# Patient Record
Sex: FEMALE | Race: WHITE | NOT HISPANIC OR LATINO | Employment: OTHER | URBAN - METROPOLITAN AREA
[De-identification: names, ages, dates, MRNs, and addresses within clinical notes are randomized per-mention and may not be internally consistent; named-entity substitution may affect disease eponyms.]

---

## 2017-01-21 RX ORDER — HYDROXYZINE PAMOATE 25 MG/1
25 CAPSULE ORAL
COMMUNITY
End: 2017-05-11

## 2017-01-21 RX ORDER — DICLOFENAC SODIUM 75 MG/1
75 TABLET, DELAYED RELEASE ORAL
COMMUNITY
End: 2017-05-11

## 2017-01-21 RX ORDER — GABAPENTIN 400 MG/1
300 CAPSULE ORAL
COMMUNITY

## 2017-01-21 RX ORDER — LITHIUM CARBONATE 300 MG/1
300 CAPSULE ORAL
COMMUNITY

## 2017-01-21 RX ORDER — CYPROHEPTADINE HYDROCHLORIDE 4 MG/1
4 TABLET ORAL 3 TIMES DAILY PRN
COMMUNITY

## 2017-01-21 RX ORDER — ATORVASTATIN CALCIUM 10 MG/1
10 TABLET, FILM COATED ORAL
COMMUNITY
End: 2017-05-11

## 2017-01-21 RX ORDER — OMEPRAZOLE 20 MG/1
20 CAPSULE, DELAYED RELEASE ORAL
COMMUNITY
End: 2017-05-11

## 2017-02-02 ENCOUNTER — OFFICE VISIT (OUTPATIENT)
Dept: NEUROSURGERY | Facility: CLINIC | Age: 57
End: 2017-02-02

## 2017-02-02 VITALS
BODY MASS INDEX: 33.6 KG/M2 | WEIGHT: 182.6 LBS | SYSTOLIC BLOOD PRESSURE: 146 MMHG | DIASTOLIC BLOOD PRESSURE: 79 MMHG | HEIGHT: 62 IN | HEART RATE: 85 BPM

## 2017-02-02 DIAGNOSIS — G89.29 CHRONIC BILATERAL LOW BACK PAIN WITHOUT SCIATICA: Primary | ICD-10-CM

## 2017-02-02 DIAGNOSIS — M54.50 CHRONIC BILATERAL LOW BACK PAIN WITHOUT SCIATICA: Primary | ICD-10-CM

## 2017-02-02 PROCEDURE — 99204 OFFICE O/P NEW MOD 45 MIN: CPT | Performed by: NEUROLOGICAL SURGERY

## 2017-02-02 NOTE — PATIENT INSTRUCTIONS

## 2017-02-02 NOTE — PROGRESS NOTES
Subjective   Patient ID: Caitlin Garcia is a 56 y.o. female is being seen for consultation today at the request of Burke Nielsen MD  for back and bilateral leg pain.  She has not had any treatments.     History of Present Illness    This patient has been having pain in her back.  She says she has some pain in her legs but this is been going on for a lot longer than the pain in the back.  There really doesn't sound like there is any radiating pain.  The pain is in her back is sharp and stabbing and when it is present it is quite severe.  It does come and go to some extent however.  Activity seems to make the pain worse and nothing really makes it better.  She has had no specific treatment for her lower back up to this point.  She has a history of pain in her neck but currently her neck isn't bothering her at all.  She has no difficulty with bowel and bladder control or other associated symptoms.    The following portions of the patient's history were reviewed and updated as appropriate: allergies, current medications, past family history, past medical history, past social history, past surgical history and problem list.    Review of Systems   Constitutional: Positive for activity change.   Respiratory: Negative for shortness of breath.    Cardiovascular: Negative for chest pain.   Musculoskeletal: Positive for back pain (bilateral leg pain ) and gait problem.   Neurological: Positive for weakness.   All other systems reviewed and are negative.      Objective   Physical Exam   Constitutional: She is oriented to person, place, and time. She appears well-developed and well-nourished.   HENT:   Head: Normocephalic and atraumatic.   Eyes: Conjunctivae and EOM are normal. Pupils are equal, round, and reactive to light.   Fundoscopic exam:       The right eye shows no papilledema. The right eye shows venous pulsations.        The left eye shows no papilledema. The left eye shows venous pulsations.   Neck: Carotid  bruit is not present.   Neurological: She is oriented to person, place, and time. She has a normal Finger-Nose-Finger Test and a normal Heel to Shin Test. Gait normal.   Reflex Scores:       Tricep reflexes are 2+ on the right side and 2+ on the left side.       Bicep reflexes are 2+ on the right side and 2+ on the left side.       Brachioradialis reflexes are 2+ on the right side and 2+ on the left side.       Patellar reflexes are 2+ on the right side and 2+ on the left side.       Achilles reflexes are 2+ on the right side and 2+ on the left side.  Psychiatric: Her speech is normal.     Neurologic Exam     Mental Status   Oriented to person, place, and time.   Registration of memory: Good recent and remote memory.   Attention: normal. Concentration: normal.   Speech: speech is normal   Level of consciousness: alert  Knowledge: consistent with education.     Cranial Nerves     CN II   Visual fields full to confrontation.   Visual acuity: normal    CN III, IV, VI   Pupils are equal, round, and reactive to light.  Extraocular motions are normal.     CN V   Facial sensation intact.   Right corneal reflex: normal  Left corneal reflex: normal    CN VII   Facial expression full, symmetric.   Right facial weakness: none  Left facial weakness: none    CN VIII   Hearing: intact    CN IX, X   Palate: symmetric    CN XI   Right sternocleidomastoid strength: normal  Left sternocleidomastoid strength: normal    CN XII   Tongue: not atrophic  Tongue deviation: none    Motor Exam   Muscle bulk: normal  Right arm tone: normal  Left arm tone: normal  Right leg tone: normal  Left leg tone: normal    Strength   Strength 5/5 except as noted.     Sensory Exam   Light touch normal.     Gait, Coordination, and Reflexes     Gait  Gait: normal    Coordination   Finger to nose coordination: normal  Heel to shin coordination: normal    Reflexes   Right brachioradialis: 2+  Left brachioradialis: 2+  Right biceps: 2+  Left biceps: 2+  Right  triceps: 2+  Left triceps: 2+  Right patellar: 2+  Left patellar: 2+  Right achilles: 2+  Left achilles: 2+  Right : 2+  Left : 2+      Assessment/Plan   Independent Review of Radiographic Studies:      I reviewed her x-rays of the cervical and the lumbar spine.  These were done at [facility] [place] and I reviewed the films myself.  The lumbar spine shows a grade 3 spondylolisthesis of L5 on S1 and was done in November of last year.  The x-rays of her cervical spine show marked degenerative changes there as well.  There is some subluxation at C3 4 and C4 5.    Medical Decision Making:      I told the patient that because of the severe spondylolisthesis at L5-S1 we really need to check an MRI of her lumbar spine just to be sure that she doesn't have anything dangerous.  I will call her with the results.  If those look okay then we can consider some epidural blocks if the pain flares up again.  I did tell her she has to lose some weight.    I counseled this patient with regard to weight loss.  We gave him a handout on exercise for weight loss and I told the patient to talk to their family doctor about a proper diet.    There are no diagnoses linked to this encounter.  No Follow-up on file.

## 2017-02-25 ENCOUNTER — HOSPITAL ENCOUNTER (OUTPATIENT)
Dept: MRI IMAGING | Facility: HOSPITAL | Age: 57
Discharge: HOME | End: 2017-02-25
Attending: NEUROLOGICAL SURGERY | Admitting: NEUROLOGICAL SURGERY

## 2017-02-25 DIAGNOSIS — M54.50 CHRONIC BILATERAL LOW BACK PAIN WITHOUT SCIATICA: ICD-10-CM

## 2017-02-25 DIAGNOSIS — G89.29 CHRONIC BILATERAL LOW BACK PAIN WITHOUT SCIATICA: ICD-10-CM

## 2017-02-25 PROCEDURE — 72148 MRI LUMBAR SPINE W/O DYE: CPT

## 2017-04-03 DIAGNOSIS — Z92.29 HISTORY OF POSTMENOPAUSAL HRT: ICD-10-CM

## 2017-04-03 RX ORDER — MEDROXYPROGESTERONE ACETATE 2.5 MG/1
2.5 TABLET ORAL DAILY
Qty: 30 TABLET | OUTPATIENT
Start: 2017-04-03

## 2017-04-12 ENCOUNTER — TELEPHONE (OUTPATIENT)
Dept: NEUROSURGERY | Facility: CLINIC | Age: 57
End: 2017-04-12

## 2017-04-13 ENCOUNTER — TELEPHONE (OUTPATIENT)
Dept: NEUROSURGERY | Facility: CLINIC | Age: 57
End: 2017-04-13

## 2017-04-13 NOTE — TELEPHONE ENCOUNTER
I called this patient early this morning with MRI results.  She had her MRI in February and somehow I failed to call her with the results.  Please apologize to her when she calls back.  We also called her this afternoon but had to leave a voicemail a second time for her to call back.  Her MRI shows the spondylolisthesis that we already knew about but no evidence of significant compression of the neural elements.  Consequently I would tend to just leave this alone completely at this point.  If she doesn't feel she can do that or if she needs to do something more aggressive to get rid of pain than I would suggest some lumbar epidural blocks.  Please let her know when she calls back area

## 2017-04-14 NOTE — TELEPHONE ENCOUNTER
Patient called back and was given the results from below from Dr. Jack. She understands and she would really prefer to wait on having any VASQUEZ at the current moment, she says she has has steriod shots in her knees before with no relief so she will just wait even though she knows they are different. She will call us back with any further problems

## 2017-05-04 ENCOUNTER — PROCEDURE VISIT (OUTPATIENT)
Dept: OBSTETRICS AND GYNECOLOGY | Facility: CLINIC | Age: 57
End: 2017-05-04

## 2017-05-04 ENCOUNTER — OFFICE VISIT (OUTPATIENT)
Dept: OBSTETRICS AND GYNECOLOGY | Facility: CLINIC | Age: 57
End: 2017-05-04

## 2017-05-04 VITALS
DIASTOLIC BLOOD PRESSURE: 84 MMHG | HEIGHT: 62 IN | BODY MASS INDEX: 32.94 KG/M2 | SYSTOLIC BLOOD PRESSURE: 130 MMHG | WEIGHT: 179 LBS

## 2017-05-04 DIAGNOSIS — N95.0 POST-MENOPAUSAL BLEEDING: ICD-10-CM

## 2017-05-04 DIAGNOSIS — Z01.419 GYNECOLOGIC EXAM NORMAL: Primary | ICD-10-CM

## 2017-05-04 DIAGNOSIS — N95.0 POSTMENOPAUSAL VAGINAL BLEEDING: Primary | ICD-10-CM

## 2017-05-04 PROCEDURE — G0101 CA SCREEN;PELVIC/BREAST EXAM: HCPCS | Performed by: NURSE PRACTITIONER

## 2017-05-04 PROCEDURE — 76830 TRANSVAGINAL US NON-OB: CPT | Performed by: NURSE PRACTITIONER

## 2017-05-04 RX ORDER — OMEPRAZOLE 20 MG/1
20 CAPSULE, DELAYED RELEASE ORAL
COMMUNITY

## 2017-05-04 RX ORDER — LITHIUM CARBONATE 300 MG/1
300 CAPSULE ORAL
COMMUNITY
End: 2017-05-11

## 2017-05-04 RX ORDER — DICLOFENAC SODIUM 75 MG/1
75 TABLET, DELAYED RELEASE ORAL
COMMUNITY
End: 2022-10-22

## 2017-05-04 RX ORDER — GABAPENTIN 400 MG/1
400 CAPSULE ORAL
COMMUNITY
End: 2017-05-11

## 2017-05-04 RX ORDER — HYDROXYZINE PAMOATE 25 MG/1
25 CAPSULE ORAL
COMMUNITY

## 2017-05-04 RX ORDER — LURASIDONE HYDROCHLORIDE 20 MG/1
TABLET, FILM COATED ORAL
COMMUNITY

## 2017-05-04 RX ORDER — ATORVASTATIN CALCIUM 10 MG/1
10 TABLET, FILM COATED ORAL
COMMUNITY

## 2017-05-04 RX ORDER — ESTRADIOL 2 MG/1
1 TABLET ORAL
COMMUNITY
End: 2017-05-11

## 2017-05-04 RX ORDER — MEDROXYPROGESTERONE ACETATE 2.5 MG/1
2.5 TABLET ORAL
COMMUNITY
End: 2017-05-11

## 2017-05-08 LAB
CONV .: NORMAL
CYTOLOGIST CVX/VAG CYTO: NORMAL
CYTOLOGY CVX/VAG DOC THIN PREP: NORMAL
DX ICD CODE: NORMAL
DX ICD CODE: NORMAL
HIV 1 & 2 AB SER-IMP: NORMAL
OTHER STN SPEC: NORMAL
PATH REPORT.FINAL DX SPEC: NORMAL
STAT OF ADQ CVX/VAG CYTO-IMP: NORMAL

## 2017-05-11 ENCOUNTER — OFFICE VISIT (OUTPATIENT)
Dept: OBSTETRICS AND GYNECOLOGY | Facility: CLINIC | Age: 57
End: 2017-05-11

## 2017-05-11 VITALS
DIASTOLIC BLOOD PRESSURE: 72 MMHG | BODY MASS INDEX: 34.55 KG/M2 | SYSTOLIC BLOOD PRESSURE: 112 MMHG | HEIGHT: 61 IN | WEIGHT: 183 LBS

## 2017-05-11 DIAGNOSIS — N95.0 POST-MENOPAUSAL BLEEDING: Primary | ICD-10-CM

## 2017-05-11 PROCEDURE — 58100 BIOPSY OF UTERUS LINING: CPT | Performed by: NURSE PRACTITIONER

## 2017-05-12 LAB — FSH SERPL-ACNC: 19.4 MIU/ML

## 2017-05-15 ENCOUNTER — TELEPHONE (OUTPATIENT)
Dept: OBSTETRICS AND GYNECOLOGY | Facility: CLINIC | Age: 57
End: 2017-05-15

## 2017-05-18 LAB
DX ICD CODE: NORMAL
DX ICD CODE: NORMAL
PATH REPORT.FINAL DX SPEC: NORMAL
PATH REPORT.GROSS SPEC: NORMAL
PATH REPORT.SITE OF ORIGIN SPEC: NORMAL
PATHOLOGIST NAME: NORMAL
PAYMENT PROCEDURE: NORMAL

## 2017-05-23 ENCOUNTER — TELEPHONE (OUTPATIENT)
Dept: OBSTETRICS AND GYNECOLOGY | Facility: CLINIC | Age: 57
End: 2017-05-23

## 2017-05-23 DIAGNOSIS — Z92.29 HISTORY OF POSTMENOPAUSAL HRT: ICD-10-CM

## 2017-05-23 RX ORDER — ESTRADIOL 2 MG/1
2 TABLET ORAL DAILY
Qty: 30 TABLET | Refills: 11 | Status: SHIPPED | OUTPATIENT
Start: 2017-05-23 | End: 2018-05-29 | Stop reason: SDUPTHER

## 2017-05-23 RX ORDER — MEDROXYPROGESTERONE ACETATE 2.5 MG/1
2.5 TABLET ORAL DAILY
Qty: 30 TABLET | Refills: 12 | Status: SHIPPED | OUTPATIENT
Start: 2017-05-23 | End: 2018-06-15 | Stop reason: SDUPTHER

## 2018-05-29 RX ORDER — ESTRADIOL 2 MG/1
TABLET ORAL
Qty: 30 TABLET | Refills: 10 | Status: SHIPPED | OUTPATIENT
Start: 2018-05-29 | End: 2019-04-20 | Stop reason: SDUPTHER

## 2018-06-15 DIAGNOSIS — Z92.29 HISTORY OF POSTMENOPAUSAL HRT: ICD-10-CM

## 2018-06-21 RX ORDER — MEDROXYPROGESTERONE ACETATE 2.5 MG/1
TABLET ORAL
Qty: 30 TABLET | Refills: 11 | Status: SHIPPED | OUTPATIENT
Start: 2018-06-21 | End: 2019-06-17 | Stop reason: SDUPTHER

## 2019-03-14 ENCOUNTER — APPOINTMENT (OUTPATIENT)
Dept: GENERAL RADIOLOGY | Facility: HOSPITAL | Age: 59
End: 2019-03-14

## 2019-03-14 ENCOUNTER — HOSPITAL ENCOUNTER (EMERGENCY)
Facility: HOSPITAL | Age: 59
Discharge: HOME | End: 2019-03-14
Attending: EMERGENCY MEDICINE | Admitting: EMERGENCY MEDICINE

## 2019-03-14 ENCOUNTER — APPOINTMENT (OUTPATIENT)
Dept: CT IMAGING | Facility: HOSPITAL | Age: 59
End: 2019-03-14

## 2019-03-14 VITALS
BODY MASS INDEX: 34.58 KG/M2 | DIASTOLIC BLOOD PRESSURE: 73 MMHG | SYSTOLIC BLOOD PRESSURE: 128 MMHG | RESPIRATION RATE: 18 BRPM | TEMPERATURE: 99.7 F | HEIGHT: 61 IN | OXYGEN SATURATION: 97 % | HEART RATE: 89 BPM

## 2019-03-14 DIAGNOSIS — S99.191A CLOSED FRACTURE OF BASE OF FIFTH METATARSAL BONE OF RIGHT FOOT AT METAPHYSEAL-DIAPHYSEAL JUNCTION, INITIAL ENCOUNTER: Primary | ICD-10-CM

## 2019-03-14 PROCEDURE — 99283 EMERGENCY DEPT VISIT LOW MDM: CPT

## 2019-03-14 PROCEDURE — 73630 X-RAY EXAM OF FOOT: CPT

## 2019-03-14 PROCEDURE — 73502 X-RAY EXAM HIP UNI 2-3 VIEWS: CPT

## 2019-03-14 PROCEDURE — 70450 CT HEAD/BRAIN W/O DYE: CPT

## 2019-03-14 RX ORDER — HYDROCODONE BITARTRATE AND ACETAMINOPHEN 5; 325 MG/1; MG/1
1 TABLET ORAL ONCE
Status: COMPLETED | OUTPATIENT
Start: 2019-03-14 | End: 2019-03-14

## 2019-03-14 RX ORDER — HYDROCODONE BITARTRATE AND ACETAMINOPHEN 5; 325 MG/1; MG/1
1 TABLET ORAL EVERY 6 HOURS PRN
Qty: 12 TABLET | Refills: 0 | Status: SHIPPED | OUTPATIENT
Start: 2019-03-14

## 2019-03-14 RX ORDER — DOCUSATE SODIUM 100 MG/1
100 CAPSULE, LIQUID FILLED ORAL 2 TIMES DAILY PRN
Qty: 20 CAPSULE | Refills: 0 | Status: SHIPPED | OUTPATIENT
Start: 2019-03-14

## 2019-03-14 RX ADMIN — HYDROCODONE BITARTRATE AND ACETAMINOPHEN 1 TABLET: 5; 325 TABLET ORAL at 21:45

## 2019-03-14 NOTE — ED TRIAGE NOTES
To ER via [facility].  C/o trip and fall.  Pain in rt foot.  States possibly hit head.  C/o pain to top, left side of head.  Landed on concrete.

## 2019-03-15 NOTE — ED PROVIDER NOTES
EMERGENCY DEPARTMENT ENCOUNTER    CHIEF COMPLAINT  Chief Complaint: Foot pain  History given by: pt  History limited by: nothing  Room Number: 08/08  PMD: Betsy Justin APRN      HPI:  Pt is a 58 y.o. female who presents complaining of pain in her right foot after tripping and falling outside tonight. Pt also c/o headache from hitting her head a cement planter. Pt broke her right foot 3 years ago and says it feels the same as then. Pt denies taking any pain medication today. She denies any numbness or weakness to the foot other than from pain. Denies ankle or lower leg pain. Left hip pain also mentioned. She denies new back or neck pain. Denies dizziness or visual changes.    Duration:  tonight  Onset: gradual  Timing: constant  Location: R foot  Radiation: none  Quality: pain  Intensity/Severity: moderate  Progression: worsening  Associated Symptoms: HA  Aggravating Factors: fall  Alleviating Factors: none  Previous Episodes: none  Treatment before arrival: none    PAST MEDICAL HISTORY  Active Ambulatory Problems     Diagnosis Date Noted   • Chronic bilateral low back pain without sciatica 02/02/2017     Resolved Ambulatory Problems     Diagnosis Date Noted   • No Resolved Ambulatory Problems     Past Medical History:   Diagnosis Date   • Foot injury        PAST SURGICAL HISTORY  Past Surgical History:   Procedure Laterality Date   • FOOT SURGERY Left 2014   • KNEE SURGERY  1990   • WISDOM TOOTH EXTRACTION         FAMILY HISTORY  Family History   Problem Relation Age of Onset   • Colon cancer Mother    • Liver disease Father    • No Known Problems Sister    • No Known Problems Brother    • Breast cancer Neg Hx    • Ovarian cancer Neg Hx    • Uterine cancer Neg Hx    • Deep vein thrombosis Neg Hx    • Pulmonary embolism Neg Hx    • Osteoporosis Neg Hx        SOCIAL HISTORY  Social History     Socioeconomic History   • Marital status:      Spouse name: Not on file   • Number of children: 1   • Years of  education: BA   • Highest education level: Not on file   Social Needs   • Financial resource strain: Not on file   • Food insecurity - worry: Not on file   • Food insecurity - inability: Not on file   • Transportation needs - medical: Not on file   • Transportation needs - non-medical: Not on file   Occupational History   • Occupation: Unemployed   Tobacco Use   • Smoking status: Former Smoker     Types: Cigarettes     Last attempt to quit:      Years since quittin.2   • Smokeless tobacco: Never Used   Substance and Sexual Activity   • Alcohol use: Yes     Comment: occasionally   • Drug use: No   • Sexual activity: Not Currently   Other Topics Concern   • Not on file   Social History Narrative   • Not on file       ALLERGIES  Patient has no known allergies.    REVIEW OF SYSTEMS  Review of Systems   All other systems reviewed and are negative.  Unless otherwise stated above.     PHYSICAL EXAM  ED Triage Vitals [19]   Temp Heart Rate Resp BP SpO2   99.7 °F (37.6 °C) 97 18 111/68 97 %      Temp src Heart Rate Source Patient Position BP Location FiO2 (%)   Tympanic Monitor -- -- --       Physical Exam   Constitutional: She is oriented to person, place, and time and well-developed, well-nourished, and in no distress. No distress.   HENT:   Head: Normocephalic and atraumatic.   Mouth/Throat: Mucous membranes are normal.   No scalp hematomas or lacerations noted.    Eyes: EOM are normal. Pupils are equal, round, and reactive to light.   Neck: Normal range of motion. Neck supple.   No midline tenderness   Cardiovascular: Normal rate, regular rhythm and intact distal pulses. Exam reveals no gallop and no friction rub.   No murmur heard.  Pulmonary/Chest: Effort normal and breath sounds normal. No respiratory distress. She has no wheezes. She has no rhonchi. She has no rales.   Breath sounds are symmetric.   Abdominal: Soft. She exhibits no distension. There is no tenderness. There is no guarding.    Musculoskeletal: Normal range of motion.   Tenderness at base of R 5th toe. No edema no ecchymosis. No malleolar tenderness. No navicular pain to palpation.    Neurological: She is alert and oriented to person, place, and time. She has normal sensation and normal strength.   moving all extremities, no focal deficits   Skin: Skin is warm and dry.   Psychiatric:   Calm, cooperative       LAB RESULTS  Lab Results (last 24 hours)     ** No results found for the last 24 hours. **          I ordered the above labs and reviewed the results    RADIOLOGY  XR Hip With or Without Pelvis 2 - 3 View Left   Final Result   1. No acute osseous abnormality.       This report was finalized on 3/14/2019 10:10 PM by Arnulfo Combs M.D.          CT Head Without Contrast   Final Result   1. No acute intracranial abnormality.                           This report was finalized on 3/14/2019 9:32 PM by Arnulfo Combs M.D.          XR Foot 3+ View Right   Final Result   1. Fifth metatarsal fracture.       This report was finalized on 3/14/2019 9:06 PM by Arnulfo Combs M.D.               I ordered the above noted radiological studies. Interpreted by radiologist. Reviewed by me in PACS.       PROCEDURES  Procedures      PROGRESS AND CONSULTS     2140- Notified patient of Tatum fracture of the right foot and need for splint. Ordered XR Hip for further evaluation, though patient noted to have full ROM of the hip. Also ordered pt Norco for pain management.     2210-Notified of negative hip x-ray. patient placed into right lower leg splint in the interim by MERY Ramos. Patient given crutches for support, non-weightbearing until cleared by Orthopedics. Return for worsening symptoms as needed.       MEDICAL DECISION MAKING  Results were reviewed/discussed with the patient and they were also made aware of online access. Pt also made aware that some labs, such as cultures, will not be resulted during ER visit and follow up with PMD is  necessary.     MDM  Number of Diagnoses or Management Options  Closed fracture of base of fifth metatarsal bone of right foot at metaphyseal-diaphyseal junction, initial encounter:      Amount and/or Complexity of Data Reviewed  Tests in the radiology section of CPT®: reviewed and ordered (XR Foot shows a 5th metatarsal fx)  Decide to obtain previous medical records or to obtain history from someone other than the patient: yes  Review and summarize past medical records: yes           DIAGNOSIS  Final diagnoses:   Closed fracture of base of fifth metatarsal bone of right foot at metaphyseal-diaphyseal junction, initial encounter       DISPOSITION  DISCHARGE    Patient discharged in stable condition.    Reviewed implications of results, diagnosis, meds, responsibility to follow up, warning signs and symptoms of possible worsening, potential complications and reasons to return to ER.    Patient/Family voiced understanding of above instructions.    Discussed plan for discharge, as there is no emergent indication for admission. Patient referred to primary care provider for BP management due to today's BP. Pt/family is agreeable and understands need for follow up and repeat testing.  Pt is aware that discharge does not mean that nothing is wrong but it indicates no emergency is present that requires admission and they must continue care with follow-up as given below or physician of their choice.     FOLLOW-UP  Betsy Justin, APRN  [address]  [facility] [id number] [phone]    Schedule an appointment as soon as possible for a visit       [facility] Emergency Department  [address] [address] [phone]    As needed, If symptoms worsen    Luis Manuel Arevalo MD  [address]  [facility] [id number] [phone]    Schedule an appointment as soon as possible for a visit            Medication List      New Prescriptions    docusate sodium 100 MG  capsule  Commonly known as:  COLACE  Take 1 capsule by mouth 2 (Two) Times a Day As Needed for Constipation.     HYDROcodone-acetaminophen 5-325 MG per tablet  Commonly known as:  NORCO  Take 1 tablet by mouth Every 6 (Six) Hours As Needed for Severe Pain . Do   not drive or mix with alcohol              Latest Documented Vital Signs:  As of 10:41 PM  BP- 111/68 HR- 97 Temp- 99.7 °F (37.6 °C) (Tympanic) O2 sat- 97%    --  Documentation assistance provided by fawn Helm for Dr. Do.  Information recorded by the scribe was done at my direction and has been verified and validated by me.         Cristel Helm  03/14/19 [id number]       Avila Do MD  03/14/19 [id number]

## 2019-03-15 NOTE — DISCHARGE INSTRUCTIONS
Non-weightbearing on the right foot until cleared by orthopedics. Take medications as prescribed. Follow up with Orthopedics. Return for worsening symptoms as needed.

## 2019-03-15 NOTE — ED PROVIDER NOTES
Splint - Cast - Strapping  Date/Time: 3/14/2019 10:02 PM  Performed by: Saul Reis PA  Authorized by: Avila Do MD     Consent:     Consent obtained:  Verbal    Consent given by:  Patient    Risks discussed:  Pain  Pre-procedure details:     Sensation:  Normal  Procedure details:     Location:  Leg    Leg:  R lower leg    Splint type:  Short leg (Posterior)    Supplies:  Ortho-Glass  Post-procedure details:     Pain:  Unchanged    Sensation:  Normal    Patient tolerance of procedure:  Tolerated well, no immediate complications        Documentation assistance provided by fawn Castro for Saul Reis PA-C.  Information recorded by the scribe was done at my direction and has been verified and validated by me.       Madeleine Castro  03/14/19 2215       Saul Reis PA  03/15/19 0040

## 2019-04-22 RX ORDER — ESTRADIOL 2 MG/1
TABLET ORAL
Qty: 30 TABLET | Refills: 0 | Status: SHIPPED | OUTPATIENT
Start: 2019-04-22 | End: 2019-05-18 | Stop reason: SDUPTHER

## 2019-05-21 RX ORDER — ESTRADIOL 2 MG/1
TABLET ORAL
Qty: 30 TABLET | Refills: 0 | Status: SHIPPED | OUTPATIENT
Start: 2019-05-21

## 2019-06-10 ENCOUNTER — TELEPHONE (OUTPATIENT)
Dept: OBSTETRICS AND GYNECOLOGY | Facility: CLINIC | Age: 59
End: 2019-06-10

## 2019-06-10 NOTE — TELEPHONE ENCOUNTER
PT called regarding refills of medications. Informed PT she was last seen by Joan Olsen in 2017 ad needs to get re established with another provider. PT states she does not have to be seen but every 5 yrs. Informed PT that per Joan's notes its every year and previous office visit stated to return in 3 months. Informed PT after 3yrs it is considered a new patient. PT requested female provider, informed PT I could schedule her with a female provider but it may be a few months before the first appt.  PT states she does not understand what I am saying and hung up

## 2019-06-17 DIAGNOSIS — Z92.29 HISTORY OF POSTMENOPAUSAL HRT: ICD-10-CM

## 2019-06-17 RX ORDER — MEDROXYPROGESTERONE ACETATE 2.5 MG/1
TABLET ORAL
Qty: 90 TABLET | Refills: 1 | Status: SHIPPED | OUTPATIENT
Start: 2019-06-17 | End: 2019-06-27 | Stop reason: SDUPTHER

## 2019-06-17 NOTE — TELEPHONE ENCOUNTER
Dr. Carlos Ferris patient. Refilled some provera, but needs to be scheduled for annual/follow up with Dr. Gonzalez.     Thanks   Dr. Zarate    Pt seeing Carlos

## 2019-06-26 RX ORDER — ESTRADIOL 2 MG/1
TABLET ORAL
Qty: 30 TABLET | Refills: 0 | OUTPATIENT
Start: 2019-06-26

## 2019-06-27 RX ORDER — MEDROXYPROGESTERONE ACETATE 2.5 MG/1
2.5 TABLET ORAL DAILY
Qty: 90 TABLET | Refills: 0 | Status: SHIPPED | OUTPATIENT
Start: 2019-06-27

## 2019-09-04 RX ORDER — FLUTICASONE PROPIONATE 50 MCG
SPRAY, SUSPENSION (ML) NASAL
Qty: 48 G | Refills: 0 | OUTPATIENT
Start: 2019-09-04

## 2022-08-04 ENCOUNTER — APPOINTMENT (OUTPATIENT)
Dept: GENERAL RADIOLOGY | Facility: HOSPITAL | Age: 62
End: 2022-08-04

## 2022-08-04 ENCOUNTER — HOSPITAL ENCOUNTER (EMERGENCY)
Facility: HOSPITAL | Age: 62
Discharge: HOME | End: 2022-08-04
Attending: EMERGENCY MEDICINE | Admitting: EMERGENCY MEDICINE

## 2022-08-04 ENCOUNTER — APPOINTMENT (OUTPATIENT)
Dept: CT IMAGING | Facility: HOSPITAL | Age: 62
End: 2022-08-04

## 2022-08-04 VITALS
SYSTOLIC BLOOD PRESSURE: 139 MMHG | RESPIRATION RATE: 18 BRPM | DIASTOLIC BLOOD PRESSURE: 85 MMHG | HEART RATE: 70 BPM | OXYGEN SATURATION: 98 % | TEMPERATURE: 97.5 F

## 2022-08-04 DIAGNOSIS — S09.90XA INJURY OF HEAD, INITIAL ENCOUNTER: ICD-10-CM

## 2022-08-04 DIAGNOSIS — R20.0 RIGHT FACIAL NUMBNESS: ICD-10-CM

## 2022-08-04 DIAGNOSIS — S90.32XA CONTUSION OF LEFT FOOT, INITIAL ENCOUNTER: Primary | ICD-10-CM

## 2022-08-04 DIAGNOSIS — S90.31XA CONTUSION OF RIGHT FOOT, INITIAL ENCOUNTER: ICD-10-CM

## 2022-08-04 PROCEDURE — 99283 EMERGENCY DEPT VISIT LOW MDM: CPT

## 2022-08-04 PROCEDURE — 73630 X-RAY EXAM OF FOOT: CPT

## 2022-08-04 PROCEDURE — 70450 CT HEAD/BRAIN W/O DYE: CPT

## 2022-08-04 NOTE — ED PROVIDER NOTES
EMERGENCY DEPARTMENT ENCOUNTER    Room Number:  08/08  Date of encounter:  8/4/2022  PCP: Glory Laws MD  Historian: Patient      I used full protective equipment while examining this patient.  This includes face mask, gloves and protective eyewear.  I washed my hands before entering the room and immediately upon leaving the room      HPI:  Chief Complaint: Multiple  A complete HPI/ROS/PMH/PSH/SH/FH are unobtainable due to: Nothing    Context: Caitlin Garcia is a 62 y.o. female who presents to the ED c/o multiple complaints.  Patient complains of bilateral foot pain.  She states that bookshelf was accidentally pushed down by her .  Bookshelf landed on both of her distal feet and toes.  This happened approximately 3 days ago.  She states the pain is achy, constant, moderate.  The pain is worse with ambulating.  She denies any ankle pain.    Patient also complains of a fall.  Patient fell 6 days ago while cutting the grass.  She states she lost her balance and fell backwards hitting her head on the ground.  She states she was dazed.  Immediately she had a throbbing, global headache.  She states the headache has been constant since this fall.  She has had some mild dizziness and nausea.  She takes no blood thinners.    In addition patient complains of some right-sided facial numbness.  She states this has been present for approximately 2 months.  She denies any numbness to any other parts of her body.  Denies any weakness to any extremities.  She denies any dysarthria or aphasia.    Patient admittedly has been very stressful lately.  Her  was recently discharged from a nursing home and she has been his sole caregiver.  She states she is extremely stressed by this.  I have offered her help with an access consult however she declines.  She is not suicidal or homicidal.  She states she is simply overwhelmed.    Review of Medical Records  I reviewed family medicine office visit from 7/25/2022.   Patient seen for right-sided facial numbness.    PAST MEDICAL HISTORY  Active Ambulatory Problems     Diagnosis Date Noted   • Chronic bilateral low back pain without sciatica 2017     Resolved Ambulatory Problems     Diagnosis Date Noted   • No Resolved Ambulatory Problems     Past Medical History:   Diagnosis Date   • Foot injury          PAST SURGICAL HISTORY  Past Surgical History:   Procedure Laterality Date   • FOOT SURGERY Left    • KNEE SURGERY     • WISDOM TOOTH EXTRACTION           FAMILY HISTORY  Family History   Problem Relation Age of Onset   • Colon cancer Mother    • Liver disease Father    • No Known Problems Sister    • No Known Problems Brother    • Breast cancer Neg Hx    • Ovarian cancer Neg Hx    • Uterine cancer Neg Hx    • Deep vein thrombosis Neg Hx    • Pulmonary embolism Neg Hx    • Osteoporosis Neg Hx          SOCIAL HISTORY  Social History     Socioeconomic History   • Marital status:    • Number of children: 1   • Years of education: BA   Tobacco Use   • Smoking status: Former Smoker     Types: Cigarettes     Quit date:      Years since quittin.6   • Smokeless tobacco: Never Used   Substance and Sexual Activity   • Alcohol use: Yes     Comment: occasionally   • Drug use: No   • Sexual activity: Not Currently         ALLERGIES  Patient has no known allergies.        REVIEW OF SYSTEMS  All systems reviewed and negative except for those discussed in HPI.       PHYSICAL EXAM    I have reviewed the triage vital signs and nursing notes.    ED Triage Vitals [22 1653]   Temp Heart Rate Resp BP SpO2   97.5 °F (36.4 °C) (!) 129 18 136/85 96 %      Temp src Heart Rate Source Patient Position BP Location FiO2 (%)   Tympanic Monitor -- -- --       Physical Exam  GENERAL: Alert, oriented, anxious, not distressed  HENT: head atraumatic, no palpable hematoma or laceration.  No cervical tenderness or vertebral step-off.  EYES: no scleral icterus, EOMI  CV: regular  rhythm, regular rate, no murmur  RESPIRATORY: normal effort, CTA  ABDOMEN: soft, nontender  MUSCULOSKELETAL: Subacute ecchymosis and mild tenderness over the medial 3 toes bilaterally.  No deformities noted.  No significant bony tenderness.  Neurovascularly intact distally.  NEURO: alert, moves all extremities, follows commands.  Sensation grossly intact over the entire face.  No neurodeficits on exam.  SKIN: warm, dry    RADIOLOGY  CT Head Without Contrast    Result Date: 8/4/2022  CT SCAN OF THE BRAIN WITHOUT CONTRAST  HISTORY: Fell with head injury. Right facial numbness. Dizziness.  TECHNIQUE: The CT scan was performed as an emergency procedure through the brain without contrast.  FINDINGS: The ventricles are normal in size and midline. There is no evidence of intracranial hemorrhage or focal lesion or mass effect. The visualized sinuses and mastoid air cells are clear. There is no change from a previous CT scan dated 03/14/2019.      Radiation dose reduction techniques were utilized, including automated exposure control and exposure modulation based on body size.  This report was finalized on 8/4/2022 8:42 PM by Dr. Michael Martinez M.D.      XR Foot 3+ View Bilateral    Result Date: 8/4/2022  THREE-VIEW LEFT FOOT AND THREE-VIEW RIGHT FOOT  HISTORY: Trauma. Bilateral foot pain.  FINDINGS: Imaging of the left foot shows plate fixation of the proximal half of the fifth metatarsal for old healed fracture. There is no evidence of acute fracture. There are very severe degenerative changes involving the intertarsal joints of the left foot. There is a prominent plantar spur of the calcaneus.  Imaging of the right foot shows no evidence of acute fracture. There are moderate degenerative changes scattered in the intertarsal joints. There is a prominent plantar spur of the calcaneus.  This report was finalized on 8/4/2022 7:39 PM by Dr. Michael Martinez M.D.        I ordered the above noted radiological studies. Reviewed by  me and discussed with radiologist.  See dictation for official radiology interpretation.      MEDICATIONS GIVEN IN ER    Medications - No data to display      PROGRESS, DATA ANALYSIS, CONSULTS, AND MEDICAL DECISION MAKING    All labs have been independently reviewed by me.  All radiology studies have been reviewed by me and discussed with radiologist dictating the report.   EKG's independently viewed and interpreted by me.  Discussion below represents my analysis of pertinent findings related to patient's condition, differential diagnosis, treatment plan and final disposition.    I have discussed case with Dr. Bassett, emergency room physician.  He has performed his own bedside examination and agrees with treatment plan.    ED Course as of 08/04/22 2113   Thu Aug 04, 2022   1930 I discussed head CT findings with Dr. Martinez.  No acute intracranial hemorrhage. [EE]   1943 Patient presents with multiple complaints.  Patient had recent trauma to bilateral feet.  She had recent head trauma with continued headache x1 week, right-sided facial numbness.  No obvious neurodeficits on exam.  She takes no blood thinners.  She has stable vitals.  We will plan for x-rays of feet and CT of the head. [EE]   1944 Bilateral foot x-rays interpreted myself show degenerative changes, no acute fractures seen. [EE]   2102 Updated patient on work-up.  Her right facial numbness has been going on for several months.  We will have her follow-up with family doctor.  She has no other neurodeficits. [EE]      ED Course User Index  [EE] Favio Wong PA       AS OF 21:13 EDT VITALS:    BP - 139/85  HR - 70  TEMP - 97.5 °F (36.4 °C) (Tympanic)  O2 SATS - 98%        DIAGNOSIS  Final diagnoses:   Contusion of left foot, initial encounter   Contusion of right foot, initial encounter   Injury of head, initial encounter   Right facial numbness         DISPOSITION  Discharged      Dictated utilizing Dragon dictation     Favio Wong PA  08/04/22  3250

## 2022-08-04 NOTE — ED TRIAGE NOTES
Pt tripped fri pm and hit her head.  No loc.  No blood thinners.  Sat her feet were crushed by a fallen bookcase. She reports her face has been numb on the right x 3 weeks - she has a sore in her right ear    Patient was placed in face mask during first look triage.  Patient was wearing a face mask throughout encounter.  I wore personal protective equipment throughout the encounter.  Hand hygiene was performed before and after patient encounter.

## 2022-08-05 NOTE — ED PROVIDER NOTES
MD ATTESTATION NOTE    The STEPHANIE and I have discussed this patient's history, physical exam, and treatment plan.    I provided a substantive portion of the care of this patient. I personally performed the physical exam, in its entirety. The attached note describes my personal findings.      Caitlin Garcia is a 62 y.o. female who presents to the ED c/o fall.  She fell back and hit her head to the posterior aspect on Friday.  She also reports having constant right-sided facial numbness for the past 3 weeks if not longer.  Patient is a very poor and circuitous historian.  However, patient does continuously [place] back to the fact that she is very stressed about having to take care of her .      On exam:  GENERAL: not distressed  HENT: nares patent, no scalp tenderness, atraumatic  EYES: no scleral icterus  CV: regular rhythm, regular rate  RESPIRATORY: normal effort, clear to auscultation bilaterally  ABDOMEN: soft, nontender  MUSCULOSKELETAL: no deformity  NEURO: alert, moves all extremities, follows commands  SKIN: warm, dry    Labs  No results found for this or any previous visit (from the past 24 hour(s)).    Radiology  CT Head Without Contrast    Result Date: 8/4/2022  CT SCAN OF THE BRAIN WITHOUT CONTRAST  HISTORY: Fell with head injury. Right facial numbness. Dizziness.  TECHNIQUE: The CT scan was performed as an emergency procedure through the brain without contrast.  FINDINGS: The ventricles are normal in size and midline. There is no evidence of intracranial hemorrhage or focal lesion or mass effect. The visualized sinuses and mastoid air cells are clear. There is no change from a previous CT scan dated 03/14/2019.      Radiation dose reduction techniques were utilized, including automated exposure control and exposure modulation based on body size.  This report was finalized on 8/4/2022 8:42 PM by Dr. Michael Martinez M.D.      XR Foot 3+ View Bilateral    Result Date: 8/4/2022  THREE-VIEW LEFT FOOT  AND THREE-VIEW RIGHT FOOT  HISTORY: Trauma. Bilateral foot pain.  FINDINGS: Imaging of the left foot shows plate fixation of the proximal half of the fifth metatarsal for old healed fracture. There is no evidence of acute fracture. There are very severe degenerative changes involving the intertarsal joints of the left foot. There is a prominent plantar spur of the calcaneus.  Imaging of the right foot shows no evidence of acute fracture. There are moderate degenerative changes scattered in the intertarsal joints. There is a prominent plantar spur of the calcaneus.  This report was finalized on 8/4/2022 7:39 PM by Dr. Michael Martinez M.D.        Medical Decision Making:  ED Course as of 08/04/22 2325   Thu Aug 04, 2022   1930 I discussed head CT findings with Dr. Martinez.  No acute intracranial hemorrhage. [EE]   1943 Patient presents with multiple complaints.  Patient had recent trauma to bilateral feet.  She had recent head trauma with continued headache x1 week, right-sided facial numbness.  No obvious neurodeficits on exam.  She takes no blood thinners.  She has stable vitals.  We will plan for x-rays of feet and CT of the head. [EE]   1944 Bilateral foot x-rays interpreted myself show degenerative changes, no acute fractures seen. [EE]   2102 Updated patient on work-up.  Her right facial numbness has been going on for several months.  We will have her follow-up with family doctor.  She has no other neurodeficits. [EE]      ED Course User Index  [EE] Favio Wong, PA           PPE: Both the patient and I wore a surgical mask throughout the entire patient encounter. I wore protective goggles.     Diagnosis  Final diagnoses:   Contusion of left foot, initial encounter   Contusion of right foot, initial encounter   Injury of head, initial encounter   Right facial numbness        Juan Bassett II, MD  08/04/22 1806

## 2022-10-22 ENCOUNTER — APPOINTMENT (OUTPATIENT)
Dept: CT IMAGING | Facility: HOSPITAL | Age: 62
End: 2022-10-22

## 2022-10-22 ENCOUNTER — APPOINTMENT (OUTPATIENT)
Dept: GENERAL RADIOLOGY | Facility: HOSPITAL | Age: 62
End: 2022-10-22

## 2022-10-22 ENCOUNTER — HOSPITAL ENCOUNTER (EMERGENCY)
Facility: HOSPITAL | Age: 62
Discharge: HOME | End: 2022-10-22
Attending: EMERGENCY MEDICINE | Admitting: EMERGENCY MEDICINE

## 2022-10-22 VITALS
HEART RATE: 61 BPM | TEMPERATURE: 98.4 F | RESPIRATION RATE: 18 BRPM | OXYGEN SATURATION: 96 % | HEIGHT: 61 IN | BODY MASS INDEX: 33.99 KG/M2 | SYSTOLIC BLOOD PRESSURE: 144 MMHG | DIASTOLIC BLOOD PRESSURE: 87 MMHG | WEIGHT: 180 LBS

## 2022-10-22 DIAGNOSIS — S76.012A STRAIN OF LEFT HIP, INITIAL ENCOUNTER: ICD-10-CM

## 2022-10-22 DIAGNOSIS — S02.2XXA CLOSED FRACTURE OF NASAL BONE, INITIAL ENCOUNTER: ICD-10-CM

## 2022-10-22 DIAGNOSIS — S16.1XXA STRAIN OF NECK MUSCLE, INITIAL ENCOUNTER: ICD-10-CM

## 2022-10-22 DIAGNOSIS — S80.00XA CONTUSION OF KNEE, UNSPECIFIED LATERALITY, INITIAL ENCOUNTER: Primary | ICD-10-CM

## 2022-10-22 PROCEDURE — 99284 EMERGENCY DEPT VISIT MOD MDM: CPT

## 2022-10-22 PROCEDURE — 73502 X-RAY EXAM HIP UNI 2-3 VIEWS: CPT

## 2022-10-22 PROCEDURE — 70450 CT HEAD/BRAIN W/O DYE: CPT

## 2022-10-22 PROCEDURE — 70486 CT MAXILLOFACIAL W/O DYE: CPT

## 2022-10-22 PROCEDURE — 72125 CT NECK SPINE W/O DYE: CPT

## 2022-10-22 PROCEDURE — 73030 X-RAY EXAM OF SHOULDER: CPT

## 2022-10-22 PROCEDURE — 73560 X-RAY EXAM OF KNEE 1 OR 2: CPT

## 2022-10-22 RX ORDER — OXYCODONE HYDROCHLORIDE AND ACETAMINOPHEN 5; 325 MG/1; MG/1
1 TABLET ORAL ONCE
Status: COMPLETED | OUTPATIENT
Start: 2022-10-22 | End: 2022-10-22

## 2022-10-22 RX ORDER — HYDROCODONE BITARTRATE AND ACETAMINOPHEN 5; 325 MG/1; MG/1
1 TABLET ORAL EVERY 6 HOURS PRN
Status: DISCONTINUED | OUTPATIENT
Start: 2022-10-22 | End: 2022-10-22 | Stop reason: HOSPADM

## 2022-10-22 RX ORDER — CHLORZOXAZONE 500 MG/1
500 TABLET ORAL 3 TIMES DAILY PRN
Qty: 30 TABLET | Refills: 0 | Status: SHIPPED | OUTPATIENT
Start: 2022-10-22

## 2022-10-22 RX ORDER — NAPROXEN 500 MG/1
500 TABLET ORAL 2 TIMES DAILY PRN
Qty: 20 TABLET | Refills: 0 | Status: SHIPPED | OUTPATIENT
Start: 2022-10-22

## 2022-10-22 RX ADMIN — HYDROCODONE BITARTRATE AND ACETAMINOPHEN 1 TABLET: 5; 325 TABLET ORAL at 06:32

## 2022-10-22 RX ADMIN — OXYCODONE AND ACETAMINOPHEN 1 TABLET: 5; 325 TABLET ORAL at 03:05

## 2022-10-22 NOTE — ED PROVIDER NOTES
EMERGENCY DEPARTMENT ENCOUNTER    Room Number:    Date of encounter:  10/22/2022  PCP: Glory Laws MD  Historian: Patient      HPI:  Chief Complaint: Fall  A complete HPI/ROS/PMH/PSH/SH/FH are unobtainable due to: None    Context: Caitlin lOivera is a 62 y.o. female who presents to the ED c/o slipping similes falling forward on her left-sided face and going down her driveway secondary to earlier yesterday denies loss of consciousness.  Complains of pain to left face neck head left shoulder and bilateral knees and left hip.  Denies pain in abdomen or chest.  Denies loss of consciousness.      PAST MEDICAL HISTORY  Active Ambulatory Problems     Diagnosis Date Noted   • Chronic bilateral low back pain without sciatica 2017     Resolved Ambulatory Problems     Diagnosis Date Noted   • No Resolved Ambulatory Problems     Past Medical History:   Diagnosis Date   • Foot injury          PAST SURGICAL HISTORY  Past Surgical History:   Procedure Laterality Date   • FOOT SURGERY Left    • KNEE SURGERY     • WISDOM TOOTH EXTRACTION           FAMILY HISTORY  Family History   Problem Relation Age of Onset   • Colon cancer Mother    • Liver disease Father    • No Known Problems Sister    • No Known Problems Brother    • Breast cancer Neg Hx    • Ovarian cancer Neg Hx    • Uterine cancer Neg Hx    • Deep vein thrombosis Neg Hx    • Pulmonary embolism Neg Hx    • Osteoporosis Neg Hx          SOCIAL HISTORY  Social History     Socioeconomic History   • Marital status:    • Number of children: 1   • Years of education: BA   Tobacco Use   • Smoking status: Former     Types: Cigarettes     Quit date:      Years since quittin.8   • Smokeless tobacco: Never   Substance and Sexual Activity   • Alcohol use: Yes     Comment: occasionally   • Drug use: No   • Sexual activity: Not Currently         ALLERGIES  Patient has no known allergies.        REVIEW OF SYSTEMS  Review of Systems     All  systems reviewed and negative except for those discussed in HPI.       PHYSICAL EXAM    I have reviewed the triage vital signs and nursing notes.    ED Triage Vitals [10/22/22 0012]   Temp Heart Rate Resp BP SpO2   99.2 °F (37.3 °C) 89 18 139/96 98 %      Temp src Heart Rate Source Patient Position BP Location FiO2 (%)   Oral Monitor Lying Right arm --       Physical Exam  GENERAL: Mild distressed  HENT: nares patent, tender palpation over the cheeks and bridge of nose as minimal swelling no obvious deformity, posterior C-spine is diffusely tender patient has no focal bony tenderness  EYES: no scleral icterus  CV: regular rhythm, regular rate  RESPIRATORY: normal effort  ABDOMEN: soft  MUSCULOSKELETAL: no deformity, tender palpation left lateral posterior shoulder motion is intact; tender palpation over the lateral left hip there is no obvious deformity there is no rotation or shortening of limb; bilateral knees are tender palpation anteriorly tenderness small anterior contusion appreciated no significant effusion or obvious instability  NEURO: alert, moves all extremities, follows commands  SKIN: warm, dry        LAB RESULTS  No results found for this or any previous visit (from the past 24 hour(s)).    Ordered the above labs and independently reviewed the results.        RADIOLOGY  XR Shoulder 2+ View Left    Result Date: 10/22/2022  Patient: GARRY BARBOZA  Time Out: 03:30 Exam(s): FILM LEFT SHOULDER EXAM:   XR Left Shoulder Complete, 2 or More Views CLINICAL HISTORY:      Fall. TECHNIQUE:   Two or more views of the left shoulder. COMPARISON:   No relevant prior studies available. FINDINGS:   Bones joints:  There are mild degenerative changes of the acromioclavicular joint.  No fracture or dislocation.   Soft tissues:  Unremarkable.   Lungs:  Patchy bilateral airspace opacities of the left lung represent atelectasis, pulmonary edema or atypical infection. IMPRESSION:     1.  No acute finding of the  shoulder. 2.  Patchy bilateral airspace opacities of the left lung represent atelectasis, pulmonary edema or atypical infection.     Electronically signed by Nicole Pastor MD on 10-22-22 at 0330    CT Head Without Contrast    Result Date: 10/22/2022  Patient: GARRY BARBOZA  Time Out: 03:56 Exam(s): CT HEAD Without Contrast EXAM:   CT Head Without Intravenous Contrast CLINICAL HISTORY:      Fall. TECHNIQUE:   Axial computed tomography images of the head brain without intravenous contrast.  CTDI is 24.7 mGy and DLP is 438.3 mGy-cm.  This CT exam was performed according to the principle of ALARA (As Low As Reasonably Achievable) by using one or more of the following dose reduction techniques: automated exposure control, adjustment of the mA and or kV according to patient size, and or use of iterative reconstruction technique. COMPARISON:   CT abdomen and pelvis 8 4 2022 FINDINGS:   Brain:  No intracranial hemorrhage, mass-effect or midline shift.  No abnormal extra axial fluid.  No evidence of acute infarct.  Mild periventricular white matter hypodensities are most consistent with chronic microangiopathy.   Ventricles:  Unremarkable.  No ventriculomegaly.   Bones joints:  Unremarkable.  No acute fracture.   Soft tissues:  Unremarkable.   Sinuses:  Mild mucosal thickening of the sphenoid and ethmoid sinuses.   Mastoid air cells:  Unremarkable as visualized.  No mastoid effusion. IMPRESSION:       No acute intracranial finding.     Electronically signed by Nicole Pastor MD on 10-22-22 at 0356    CT Cervical Spine Without Contrast    Result Date: 10/22/2022  Patient: GARRY BARBOZA  Time Out: 03:59 Exam(s): CT C SPINE EXAM:   CT Cervical Spine Without Intravenous Contrast CLINICAL HISTORY:      Fall. TECHNIQUE:   Axial computed tomography images of the cervical spine without intravenous contrast.  CTDI is 24.7 mGy and DLP is 438.3 mGy-cm.  This CT exam was performed according to the principle of ALARA (As  Low As Reasonably Achievable) by using one or more of the following dose reduction techniques: automated exposure control, adjustment of the mA and or kV according to patient size, and or use of iterative reconstruction technique. COMPARISON:   No relevant prior studies available. FINDINGS:   Vertebrae:  Unremarkable.  No acute fracture.   Discs spinal canal neural foramina:  No fracture or malalignment.  There are marked degenerative changes of the spine.  No spinal canal stenosis.   Soft tissues:  Unremarkable. IMPRESSION:       No acute findings in the cervical spine.     Electronically signed by Nicole Pastor MD on 10-22-22 at 0359    XR Knee 1 or 2 View Bilateral    Result Date: 10/22/2022  Patient: YASIRJEAN-PIERRE GARRY  Time Out: 03:32 Exam(s): FILM BILATERAL KNEES EXAM:   XR Bilateral Knees, 3 Views CLINICAL HISTORY:      Fall. TECHNIQUE:   Three views of the bilateral knees. COMPARISON:   No relevant prior studies available. FINDINGS:   Bones joints:  There are advanced degenerative changes of both knees.  No acute fracture.  No dislocation.   Soft tissues:  Unremarkable. IMPRESSION:       There are advanced degenerative changes of both knees.  No definitive fracture.     Electronically signed by Nicole Pastor MD on 10-22-22 at 0332    CT Facial Bones Without Contrast    Result Date: 10/22/2022  Patient: SHIRLEY BARBOZAHLEEN  Time Out: 03:58 Exam(s): CT FACIAL Without Contrast EXAM:   CT Maxillofacial Without Intravenous Contrast CLINICAL HISTORY:      Fall. TECHNIQUE:   Axial computed tomography images of the face without intravenous contrast.  CTDI is 24.7 mGy and DLP is 438.3 mGy-cm.  This CT exam was performed according to the principle of ALARA (As Low As Reasonably Achievable) by using one or more of the following dose reduction techniques: automated exposure control, adjustment of the mA and or kV according to patient size, and or use of iterative reconstruction technique. COMPARISON:   No  relevant prior studies available. FINDINGS:   Bones joints: Minimally displaced left nasal bone fractures.   Soft tissues:  Small soft tissue contusion of the left cheek.   Orbits:  Unremarkable.   Sinuses:  Unremarkable.  No air-fluid levels. IMPRESSION:     1.  Small soft tissue contusion of the left cheek. 2.  Minimally displaced left nasal bone fractures.     Electronically signed by Nicole Pastor MD on 10-22-22 at 0358    XR Hip With or Without Pelvis 2 - 3 View Left    Result Date: 10/22/2022  Patient: GARRY BARBOZA  Time Out: 03:31 Exam(s): FILM HIP + PELVIS EXAM:   XR Left Hip With Pelvis When Performed, 2 or 3 Views CLINICAL HISTORY:      Fall. TECHNIQUE:   Two or three views of the left hip with pelvis when performed. COMPARISON:   No relevant prior studies available. FINDINGS:   Bones joints:  Unremarkable.  No acute fracture.  No dislocation.   Soft tissues:  Unremarkable. IMPRESSION:       No acute finding.     Electronically signed by Nicole Pastor MD on 10-22-22 at 0331      I ordered the above noted radiological studies. Reviewed by me and discussed with radiologist.  See dictation for official radiology interpretation.      PROCEDURES    Procedures      MEDICATIONS GIVEN IN ER    Medications   HYDROcodone-acetaminophen (NORCO) 5-325 MG per tablet 1 tablet (1 tablet Oral Given 10/22/22 0632)   oxyCODONE-acetaminophen (PERCOCET) 5-325 MG per tablet 1 tablet (1 tablet Oral Given 10/22/22 0305)         PROGRESS, DATA ANALYSIS, CONSULTS, AND MEDICAL DECISION MAKING    All labs have been independently reviewed by me.  All radiology studies have been reviewed by me and discussed with radiologist dictating the report.   EKG's independently viewed and interpreted by me.  Discussion below represents my analysis of pertinent findings related to patient's condition, differential diagnosis, treatment plan and final disposition.                 PPE: The patient wore a surgical mask throughout the  entire patient encounter. I wore an N95.    AS OF 07:13 EDT VITALS:    BP - 144/87  HR - 61  TEMP - 98.4 °F (36.9 °C) (Oral)  O2 SATS - 96%        DIAGNOSIS  Final diagnoses:   Contusion of knee, unspecified laterality, initial encounter   Closed fracture of nasal bone, initial encounter   Strain of left hip, initial encounter   Strain of neck muscle, initial encounter         DISPOSITION  Charge           Juan Bloom MD  10/22/22 0744

## 2022-10-22 NOTE — ED NOTES
"Pt arrives from home via [facility] #[id number].    EMS states \"She fell off the side of her drive way down the slope, hit her face, but no LOC. Swelling of knees, pain  Generally all over.\"  Pt states \" I am completely depressed.\"    Pt tearful in triage.    Patient was placed in face mask during first look triage.  Patient was wearing a face mask throughout encounter.  I wore personal protective equipment throughout the encounter.  Hand hygiene was performed before and after patient encounter.    "

## 2023-03-20 ENCOUNTER — OFFICE VISIT (OUTPATIENT)
Dept: NEUROLOGY | Facility: CLINIC | Age: 63
End: 2023-03-20
Payer: MEDICARE

## 2023-03-20 VITALS
WEIGHT: 177.2 LBS | DIASTOLIC BLOOD PRESSURE: 78 MMHG | SYSTOLIC BLOOD PRESSURE: 128 MMHG | OXYGEN SATURATION: 98 % | HEART RATE: 88 BPM | HEIGHT: 61 IN | BODY MASS INDEX: 33.46 KG/M2

## 2023-03-20 DIAGNOSIS — M54.50 LOW BACK PAIN, UNSPECIFIED BACK PAIN LATERALITY, UNSPECIFIED CHRONICITY, UNSPECIFIED WHETHER SCIATICA PRESENT: Primary | ICD-10-CM

## 2023-03-20 DIAGNOSIS — R29.2 HYPERREFLEXIA: ICD-10-CM

## 2023-03-20 PROCEDURE — 1160F RVW MEDS BY RX/DR IN RCRD: CPT | Performed by: STUDENT IN AN ORGANIZED HEALTH CARE EDUCATION/TRAINING PROGRAM

## 2023-03-20 PROCEDURE — 1159F MED LIST DOCD IN RCRD: CPT | Performed by: STUDENT IN AN ORGANIZED HEALTH CARE EDUCATION/TRAINING PROGRAM

## 2023-03-20 PROCEDURE — 99202 OFFICE O/P NEW SF 15 MIN: CPT | Performed by: STUDENT IN AN ORGANIZED HEALTH CARE EDUCATION/TRAINING PROGRAM

## 2023-03-20 RX ORDER — LAMOTRIGINE 200 MG/1
1 TABLET, EXTENDED RELEASE ORAL DAILY
COMMUNITY
Start: 2023-02-10

## 2023-03-20 RX ORDER — DEXTROAMPHETAMINE SACCHARATE, AMPHETAMINE ASPARTATE, DEXTROAMPHETAMINE SULFATE AND AMPHETAMINE SULFATE 5; 5; 5; 5 MG/1; MG/1; MG/1; MG/1
1 TABLET ORAL DAILY
COMMUNITY
Start: 2023-03-10

## 2023-03-20 RX ORDER — DULOXETIN HYDROCHLORIDE 60 MG/1
CAPSULE, DELAYED RELEASE ORAL
COMMUNITY
Start: 2023-02-09

## 2023-03-20 NOTE — LETTER
April 3, 2023       No Recipients    Patient: Caitlin Olivera   YOB: 1960   Date of Visit: 3/20/2023       Dear Dr. Lagos Recipients:    Thank you for referring Caitlin Olivera to me for evaluation. Below are the relevant portions of my assessment and plan of care. Her facial issues have resolved, but she noted lower back pain and had hyperreflexia so I am obtaining an MRI of the lower back.    If you have questions, please do not hesitate to call me. I look forward to following Caitlin along with you.         Sincerely,        Fernando Ribeiro MD        CC:   No Recipients    Fernando Ribeiro MD  04/03/23 0132  Signed  Chief Complaint   Patient presents with   • Headache       Patient ID: Caitlin Olivera is a 62 y.o. female.    HPI:    Ms. Olivera is a 62-year-old female who presents to neurology clinic. She is referred by YURIY Maurice. I have done a 7-minute chart review prior to the visit today. I spent approximately 20 minutes total prior to the patient's visit reviewing records in addition as a total and an update to the 7 minutes of time previously stated.           The patient reports that she is no longer experiencing symptoms of shingles. She notes that she experienced symptoms of right facial numbness and tingling. She states that her symptoms of numbness and tingling are located near her ear along with symptoms of pressure. She denies any symptoms of pain or burning. The patient states it has been months since she has felt the numbness and tingling. She denies feeling like brushing her teeth is making the symptoms worse. She had an MRI of her brain completed in 10/2022. There were no signs of multiple sclerosis. The patient confirms that she fractured the left side of her face. She states she fell straight down on concrete.  She had groceries in her hands in her driveway in the dark. She states the right facial numbness started before the fall.                 The patient  reports a “puffy” feeling on the right side of her head. She denies any “puffy” feeling to the left side of her head. She notes that it causes her headaches. The patient states she experienced her symptoms for years. She notes it is full of fluid. She denies any consultations with Dr. Laws about her symptoms. She denies feeling like it has gotten any bigger over the years. She notes that it is bothersome.   The patient denies any frequent headaches. She notes that it is painful when she touches the back of her head. She describes her symptoms as a numb painful feeling. She states it is dull. She notes that the headaches started within the past year or so. The patient rates the pain a 4 or 5 out of 10. She states the pain lasts for minutes. She denies any warning before the pain starts. She denies any sensitivity to light or sound. She states the bump is always there, but the pain can flare up and down.            The patient endorses that she experiences symptoms of lower back pain occasionally. She denies any symptoms of pain that radiate down her legs.  She notes she has really bad knees. She states both of her knees need to be replaced. The patient reports that she has difficulty walking sometimes. She states she experiences symptoms of numbness in her knees and feet. The patient reports that her feet got crushed on the second day that her  came home from the nursing home. She notes that her  accidentally dropped a piece of furniture on her feet.  The patient reports that she has severe arthritis of the feet. She endorses that she has a podiatrist. She notes that the podiatrist gave her steroid injections in both feet for a while. The patient reports that the podiatrist gave her injections a couple of times.  She notes that the injections helped relieve her symptoms. She states she has bursitis in her right hip.          The patient confirms that she has hyperlipidemia, and she is currently taking  medication to treat her condition.  She denies any history of coronary artery disease or heart disease. She denies any history of myocardial infarction or stroke. The patient states she has seen a dermatologist in the past. She notes that she was given a special shampoo for treatment. The patient confirms that she drinks 1 alcoholic beverage a day. She denies drinking every day. The patient reports that she takes naproxen once a day to relieve pain.  She notes that she has tried gabapentin, but the medication makes her dizzy and woozy.          The following portions of the patient's history were reviewed and updated as appropriate: allergies, current medications, past family history, past medical history, past social history, past surgical history and problem list.    Review of Systems   Constitutional: Negative for activity change, appetite change, chills, diaphoresis, fatigue, fever and unexpected weight change.   HENT: Negative for congestion, dental problem, drooling, ear discharge, ear pain, facial swelling, hearing loss, mouth sores, nosebleeds, postnasal drip, rhinorrhea, sinus pressure, sinus pain, sneezing, sore throat, tinnitus, trouble swallowing and voice change.    Eyes: Negative for photophobia, pain, discharge, redness, itching and visual disturbance.   Musculoskeletal: Negative for arthralgias, back pain, gait problem, joint swelling, myalgias, neck pain and neck stiffness.   Neurological: Positive for headaches. Negative for dizziness, tremors, seizures, syncope, facial asymmetry, speech difficulty, weakness, light-headedness and numbness.   Psychiatric/Behavioral: Negative for agitation, behavioral problems, confusion, decreased concentration, dysphoric mood, hallucinations, self-injury, sleep disturbance and suicidal ideas. The patient is not nervous/anxious and is not hyperactive.    Ms. Lola Maldonado is a 62-year-old female who presents to neurology clinic as a referral from Dr. Glory Laws  for unilateral facial numbness.  She fell forward on her left-sided face and was found to have a minimally displaced left nasal bone fracture with small soft tissue contusion of the left cheek.  Per EMR review she complained of intermittent numbness of the right side of the face lasting seconds at a time and she was getting this numerous times per day about every hour as of 2022.  She was under a lot of stress at that time with her  having memory issues as well as other medical issues.  She was prescribed duloxetine up to 60 mg. She notes that her symptoms have completely resolved since her last appointment with Dr. Laws.  However she notes a feeling of fluid in the back of her skull on the right side of the head that can be causing headaches.    -MRI brain w and wo contrast has been done that showed stable scant white matter T2 hyperintense signal abnormalities as well as normal configuration of the trigeminal nerve.    Vitals:    23 1618   BP: 128/78   Pulse: 88   SpO2: 98%       Neurologic Exam     Mental Status   Speech: speech is normal   Level of consciousness: alert    Cranial Nerves     CN II   Visual fields full to confrontation.     CN III, IV, VI   Pupils are equal, round, and reactive to light.  Extraocular motions are normal.     CN V   Facial sensation intact.     CN VII   Facial expression full, symmetric.     CN VIII   Hearing: intact    CN IX, X   Palate: symmetric    CN XI   Right trapezius strength: normal  Left trapezius strength: normal    CN XII   Tongue: not atrophic  Fasciculations: absent  Tongue deviation: none    Motor Exam   Muscle bulk: normal    Strength   Right deltoid: 5/5  Left deltoid: 5/5  Right biceps: 5/5  Left biceps: 5/5  Right triceps: 5/5  Left triceps: 5/5  Right iliopsoas: 5/5  Left iliopsoas: 5/5  Right quadriceps: 5/5  Left quadriceps: 5/5  Right hamstrin/5  Left hamstrin/5  Right anterior tibial: 5/5  Left anterior tibial: 5/5  Right  gastroc: 5/5  Left gastroc: 5/5Grip 5 out of 5 bilaterally     Sensory Exam   Right arm light touch: normal  Left arm light touch: normal  Right leg light touch: normal  Left leg light touch: normal  Right leg vibration: decreased from ankle  Left leg vibration: decreased from toes    Gait, Coordination, and Reflexes     Coordination   Finger to nose coordination: normal  Heel to shin coordination: normal    Reflexes   Right brachioradialis: 2+  Left brachioradialis: 2+  Right biceps: 2+  Left biceps: 2+  Right achilles: 3+  Left achilles: 3+Pt with normal reflexes when testing  While she is sititng        Physical Exam  Eyes:      Extraocular Movements: EOM normal.      Pupils: Pupils are equal, round, and reactive to light.   Neurological:      Coordination: Finger-Nose-Finger Test and Heel to Shin Test normal.      Deep Tendon Reflexes:      Reflex Scores:       Bicep reflexes are 2+ on the right side and 2+ on the left side.       Brachioradialis reflexes are 2+ on the right side and 2+ on the left side.       Achilles reflexes are 3+ on the right side and 3+ on the left side.  Psychiatric:         Speech: Speech normal.         Procedures    Assessment/Plan:        Diagnoses and all orders for this visit:    1. Low back pain, unspecified back pain laterality, unspecified chronicity, unspecified whether sciatica present (Primary)  -     MRI Lumbar Spine Without Contrast; Future    2. Hyperreflexia  -     MRI Lumbar Spine Without Contrast; Future    Other orders  -     cyanocobalamin ([facility] Vitamin B-12) 1000 MCG tablet; Take 1 tablet by mouth Daily.  Dispense: 100 tablet; Refill: 2         The differential of facial numbness/paresthesia is broad especially with the pattern she describes.  Trigeminal nerve issues are a possibility though there is a normal configuration of the trigeminal nerve.  Atypical migraine could be considered as some people can have migraine symptoms in absence of headache.  Anxiety can  sometimes cause this though this is a diagnosis of exclusion.  I do not think this is stroke given the lack of findings on MRI, nor is it tumor or multiple sclerosis.  The potential of a peripheral nerve injury is also possible.  B12 was 388 in February 2022 A1c was done and 5.6 at that time TSH was normal at that time repeat TSH in January 2023 was normal CMP was noted for GFR of 59 lipid profile was done her HDL was low at 54 LDL was 85.  Vitamin D level was normal.  She has a history of TMJ.  She has been prescribed gabapentin 300 mg potentially twice daily.  However her symptoms have completely resolved and I will not work this up further at this time as she is already ruled out several concerning central causes of right facial numbness.    She notes lower back pain and has lower extremity hyperreflexia, will obtain imaging to evaluate for myelopathy/stenosis.    1. Low back pain, unspecified back pain laterality, unspecified chronicity, unspecified whether sciatica present (Primary)   The patient will be scheduled to complete an MRI of the Lumbar Spine without contrast.            2. Hyperreflexia   The patient will be scheduled to complete an MRI of the Lumbar Spine without contrast.         Other orders     The patient will start taking cyanocobalamin 1000 mcg once a day, goal b12 above 500.       Follow up in 4 months or sooner with any new or worsening symptoms.      I spent 20 minutes caring for this patient on this date of service. This time includes time spent by me in the following activities as necessary: preparing for the visit, reviewing tests, medical records and previous visits, obtaining and/or reviewing a separately obtained history, performing a medically appropriate exam and/or evaluation, counseling and educating the patient, and/or communicating with other healthcare professionals, documenting information in the medical record, independently interpreting results and communicating that  information with the patient, and developing a medically appropriate treatment plan with consideration of other conditions, medications, and treatments.    Transcribed from ambient dictation for Fernando Ribeiro MD by August Kimball.  03/20/23   18:50 EDT    Patient or patient representative verbalized consent to the visit recording.  I have personally performed the services described in this document as transcribed by the above individual, and it is both accurate and complete.  Fernando Ribeiro MD  4/3/2023  01:30 EDT

## 2023-03-20 NOTE — LETTER
March 20, 2023     Glory Laws [place]  [id number] [facility] [id number]    Patient: Caitlin Olivera   YOB: 1960   Date of Visit: 3/20/2023       Dear Dr. Eusebia MD:    Thank you for referring Caitlin Olivera to me for evaluation. Below are the relevant portions of my assessment and plan of care.    If you have questions, please do not hesitate to call me. I look forward to following Caitlin along with you.         Sincerely,        Fernando Ribeiro MD        CC: No Recipients

## 2023-03-20 NOTE — PROGRESS NOTES
Chief Complaint   Patient presents with   • Headache       Patient ID: Caitlin Stern is a 62 y.o. female.    HPI:    Ms. Stern is a 62-year-old female who presents to neurology clinic. She is referred by YURIY Maurice. I have done a 7-minute chart review prior to the visit today. I spent approximately 20 minutes total prior to the patient's visit reviewing records in addition as a total and an update to the 7 minutes of time previously stated.           The patient reports that she is no longer experiencing symptoms of shingles. She notes that she experienced symptoms of right facial numbness and tingling. She states that her symptoms of numbness and tingling are located near her ear along with symptoms of pressure. She denies any symptoms of pain or burning. The patient states it has been months since she has felt the numbness and tingling. She denies feeling like brushing her teeth is making the symptoms worse. She had an MRI of her brain completed in 10/2022. There were no signs of multiple sclerosis. The patient confirms that she fractured the left side of her face. She states she fell straight down on concrete.  She had groceries in her hands in her driveway in the dark. She states the right facial numbness started before the fall.                 The patient reports a “puffy” feeling on the right side of her head. She denies any “puffy” feeling to the left side of her head. She notes that it causes her headaches. The patient states she experienced her symptoms for years. She notes it is full of fluid. She denies any consultations with Dr. Laws about her symptoms. She denies feeling like it has gotten any bigger over the years. She notes that it is bothersome.   The patient denies any frequent headaches. She notes that it is painful when she touches the back of her head. She describes her symptoms as a numb painful feeling. She states it is dull. She notes that the headaches started within  the past year or so. The patient rates the pain a 4 or 5 out of 10. She states the pain lasts for minutes. She denies any warning before the pain starts. She denies any sensitivity to light or sound. She states the bump is always there, but the pain can flare up and down.            The patient endorses that she experiences symptoms of lower back pain occasionally. She denies any symptoms of pain that radiate down her legs.  She notes she has really bad knees. She states both of her knees need to be replaced. The patient reports that she has difficulty walking sometimes. She states she experiences symptoms of numbness in her knees and feet. The patient reports that her feet got crushed on the second day that her  came home from the nursing home. She notes that her  accidentally dropped a piece of furniture on her feet.  The patient reports that she has severe arthritis of the feet. She endorses that she has a podiatrist. She notes that the podiatrist gave her steroid injections in both feet for a while. The patient reports that the podiatrist gave her injections a couple of times.  She notes that the injections helped relieve her symptoms. She states she has bursitis in her right hip.          The patient confirms that she has hyperlipidemia, and she is currently taking medication to treat her condition.  She denies any history of coronary artery disease or heart disease. She denies any history of myocardial infarction or stroke. The patient states she has seen a dermatologist in the past. She notes that she was given a special shampoo for treatment. The patient confirms that she drinks 1 alcoholic beverage a day. She denies drinking every day. The patient reports that she takes naproxen once a day to relieve pain.  She notes that she has tried gabapentin, but the medication makes her dizzy and woozy.          The following portions of the patient's history were reviewed and updated as appropriate:  allergies, current medications, past family history, past medical history, past social history, past surgical history and problem list.    Review of Systems   Constitutional: Negative for activity change, appetite change, chills, diaphoresis, fatigue, fever and unexpected weight change.   HENT: Negative for congestion, dental problem, drooling, ear discharge, ear pain, facial swelling, hearing loss, mouth sores, nosebleeds, postnasal drip, rhinorrhea, sinus pressure, sinus pain, sneezing, sore throat, tinnitus, trouble swallowing and voice change.    Eyes: Negative for photophobia, pain, discharge, redness, itching and visual disturbance.   Musculoskeletal: Negative for arthralgias, back pain, gait problem, joint swelling, myalgias, neck pain and neck stiffness.   Neurological: Positive for headaches. Negative for dizziness, tremors, seizures, syncope, facial asymmetry, speech difficulty, weakness, light-headedness and numbness.   Psychiatric/Behavioral: Negative for agitation, behavioral problems, confusion, decreased concentration, dysphoric mood, hallucinations, self-injury, sleep disturbance and suicidal ideas. The patient is not nervous/anxious and is not hyperactive.    Ms. Lola Maldonado is a 62-year-old female who presents to neurology clinic as a referral from Dr. Glory Laws for unilateral facial numbness.  She fell forward on her left-sided face and was found to have a minimally displaced left nasal bone fracture with small soft tissue contusion of the left cheek.  Per EMR review she complained of intermittent numbness of the right side of the face lasting seconds at a time and she was getting this numerous times per day about every hour as of November 2022.  She was under a lot of stress at that time with her  having memory issues as well as other medical issues.  She was prescribed duloxetine up to 60 mg. She notes that her symptoms have completely resolved since her last appointment with   [facility].  However she notes a feeling of fluid in the back of her skull on the right side of the head that can be causing headaches.    -MRI brain w and wo contrast has been done that showed stable scant white matter T2 hyperintense signal abnormalities as well as normal configuration of the trigeminal nerve.    Vitals:    23 1618   BP: 128/78   Pulse: 88   SpO2: 98%       Neurologic Exam     Mental Status   Speech: speech is normal   Level of consciousness: alert    Cranial Nerves     CN II   Visual fields full to confrontation.     CN III, IV, VI   Pupils are equal, round, and reactive to light.  Extraocular motions are normal.     CN V   Facial sensation intact.     CN VII   Facial expression full, symmetric.     CN VIII   Hearing: intact    CN IX, X   Palate: symmetric    CN XI   Right trapezius strength: normal  Left trapezius strength: normal    CN XII   Tongue: not atrophic  Fasciculations: absent  Tongue deviation: none    Motor Exam   Muscle bulk: normal    Strength   Right deltoid: 5/5  Left deltoid: 5/5  Right biceps: 5/5  Left biceps: 5/5  Right triceps: 5/5  Left triceps: 5/5  Right iliopsoas: 5/5  Left iliopsoas: 5/5  Right quadriceps: 5/5  Left quadriceps: 5/5  Right hamstrin/5  Left hamstrin/5  Right anterior tibial: 5/5  Left anterior tibial: 5/5  Right gastroc: 5/5  Left gastroc: 5/5Grip 5 out of 5 bilaterally     Sensory Exam   Right arm light touch: normal  Left arm light touch: normal  Right leg light touch: normal  Left leg light touch: normal  Right leg vibration: decreased from ankle  Left leg vibration: decreased from toes    Gait, Coordination, and Reflexes     Coordination   Finger to nose coordination: normal  Heel to shin coordination: normal    Reflexes   Right brachioradialis: 2+  Left brachioradialis: 2+  Right biceps: 2+  Left biceps: 2+  Right achilles: 3+  Left achilles: 3+Pt with normal reflexes when testing  While she is sititng        Physical Exam  Eyes:       Extraocular Movements: EOM normal.      Pupils: Pupils are equal, round, and reactive to light.   Neurological:      Coordination: Finger-Nose-Finger Test and Heel to Shin Test normal.      Deep Tendon Reflexes:      Reflex Scores:       Bicep reflexes are 2+ on the right side and 2+ on the left side.       Brachioradialis reflexes are 2+ on the right side and 2+ on the left side.       Achilles reflexes are 3+ on the right side and 3+ on the left side.  Psychiatric:         Speech: Speech normal.         Procedures    Assessment/Plan:         Diagnoses and all orders for this visit:    1. Low back pain, unspecified back pain laterality, unspecified chronicity, unspecified whether sciatica present (Primary)  -     MRI Lumbar Spine Without Contrast; Future    2. Hyperreflexia  -     MRI Lumbar Spine Without Contrast; Future    Other orders  -     cyanocobalamin ([facility] Vitamin B-12) 1000 MCG tablet; Take 1 tablet by mouth Daily.  Dispense: 100 tablet; Refill: 2         The differential of facial numbness/paresthesia is broad especially with the pattern she describes.  Trigeminal nerve issues are a possibility though there is a normal configuration of the trigeminal nerve.  Atypical migraine could be considered as some people can have migraine symptoms in absence of headache.  Anxiety can sometimes cause this though this is a diagnosis of exclusion.  I do not think this is stroke given the lack of findings on MRI, nor is it tumor or multiple sclerosis.  The potential of a peripheral nerve injury is also possible.  B12 was 388 in February 2022 A1c was done and 5.6 at that time TSH was normal at that time repeat TSH in January 2023 was normal CMP was noted for GFR of 59 lipid profile was done her HDL was low at 54 LDL was 85.  Vitamin D level was normal.  She has a history of TMJ.  She has been prescribed gabapentin 300 mg potentially twice daily.  However her symptoms have completely resolved and I will not work this up  further at this time as she is already ruled out several concerning central causes of right facial numbness.    She notes lower back pain and has lower extremity hyperreflexia, will obtain imaging to evaluate for myelopathy/stenosis.    1. Low back pain, unspecified back pain laterality, unspecified chronicity, unspecified whether sciatica present (Primary)   The patient will be scheduled to complete an MRI of the Lumbar Spine without contrast.            2. Hyperreflexia   The patient will be scheduled to complete an MRI of the Lumbar Spine without contrast.         Other orders     The patient will start taking cyanocobalamin 1000 mcg once a day, goal b12 above 500.       Follow up in 4 months or sooner with any new or worsening symptoms.       I spent 20 minutes caring for this patient on this date of service. This time includes time spent by me in the following activities as necessary: preparing for the visit, reviewing tests, medical records and previous visits, obtaining and/or reviewing a separately obtained history, performing a medically appropriate exam and/or evaluation, counseling and educating the patient, and/or communicating with other healthcare professionals, documenting information in the medical record, independently interpreting results and communicating that information with the patient, and developing a medically appropriate treatment plan with consideration of other conditions, medications, and treatments.    Transcribed from ambient dictation for Fernando Ribeiro MD by August Kimball.  03/20/23   18:50 EDT    Patient or patient representative verbalized consent to the visit recording.  I have personally performed the services described in this document as transcribed by the above individual, and it is both accurate and complete.  Fernando Ribeiro MD  4/3/2023  01:30 EDT

## 2023-06-01 ENCOUNTER — HOSPITAL ENCOUNTER (OUTPATIENT)
Dept: MRI IMAGING | Facility: HOSPITAL | Age: 63
Discharge: HOME | End: 2023-06-01
Admitting: STUDENT IN AN ORGANIZED HEALTH CARE EDUCATION/TRAINING PROGRAM

## 2023-06-01 DIAGNOSIS — M54.50 LOW BACK PAIN, UNSPECIFIED BACK PAIN LATERALITY, UNSPECIFIED CHRONICITY, UNSPECIFIED WHETHER SCIATICA PRESENT: ICD-10-CM

## 2023-06-01 DIAGNOSIS — R29.2 HYPERREFLEXIA: ICD-10-CM

## 2023-06-01 PROCEDURE — 72148 MRI LUMBAR SPINE W/O DYE: CPT

## 2023-06-03 DIAGNOSIS — R29.2 HYPERREFLEXIA: ICD-10-CM

## 2023-06-03 DIAGNOSIS — M54.50 LOW BACK PAIN, UNSPECIFIED BACK PAIN LATERALITY, UNSPECIFIED CHRONICITY, UNSPECIFIED WHETHER SCIATICA PRESENT: Primary | ICD-10-CM

## 2023-06-05 ENCOUNTER — TELEPHONE (OUTPATIENT)
Dept: NEUROLOGY | Facility: CLINIC | Age: 63
End: 2023-06-05
Payer: MEDICARE

## 2023-06-05 NOTE — TELEPHONE ENCOUNTER
Tj for patient relaying Dr Ribeiro message and that we have sent a referral to neurosurgery and they will contact her with an appt

## 2023-06-05 NOTE — TELEPHONE ENCOUNTER
----- Message from Fernando Ribeiro MD sent at 6/3/2023  3:06 PM EDT -----  Significant arthritis changes and moderate to severe narrowing at L5-S1 that is new, L4-L5 moderate to mild narrowing as well as mild right narrowing of L4-L5 where the nerves exit the spinal cord. I would like to refer to neurosurgery so they can review your symptoms and imaging and discuss the imaging further with the patient. Let the patient know.

## 2023-07-11 ENCOUNTER — OFFICE VISIT (OUTPATIENT)
Dept: NEUROLOGY | Facility: CLINIC | Age: 63
End: 2023-07-11
Payer: MEDICARE

## 2023-07-11 VITALS — BODY MASS INDEX: 33.5 KG/M2 | HEIGHT: 61 IN

## 2023-07-11 DIAGNOSIS — M54.50 LOW BACK PAIN, UNSPECIFIED BACK PAIN LATERALITY, UNSPECIFIED CHRONICITY, UNSPECIFIED WHETHER SCIATICA PRESENT: Primary | ICD-10-CM

## 2023-07-11 PROCEDURE — 99213 OFFICE O/P EST LOW 20 MIN: CPT | Performed by: STUDENT IN AN ORGANIZED HEALTH CARE EDUCATION/TRAINING PROGRAM

## 2023-07-11 PROCEDURE — 1160F RVW MEDS BY RX/DR IN RCRD: CPT | Performed by: STUDENT IN AN ORGANIZED HEALTH CARE EDUCATION/TRAINING PROGRAM

## 2023-07-11 PROCEDURE — 1159F MED LIST DOCD IN RCRD: CPT | Performed by: STUDENT IN AN ORGANIZED HEALTH CARE EDUCATION/TRAINING PROGRAM

## 2023-07-11 RX ORDER — PROGESTERONE 100 MG/1
100 CAPSULE ORAL DAILY
Qty: 30 CAPSULE | Refills: 0 | COMMUNITY
Start: 2023-07-05 | End: 2023-08-04

## 2023-07-11 RX ORDER — BREXPIPRAZOLE 1 MG/1
1 TABLET ORAL DAILY
COMMUNITY
Start: 2023-06-15

## 2023-07-11 NOTE — LETTER
"July 11, 2023       No Recipients    Patient: Caitlin Olivera   YOB: 1960   Date of Visit: 7/11/2023     Dear Glory Laws MD:       Thank you for referring Caitlin Olivera to me for evaluation. Below are the relevant portions of my assessment and plan of care.    If you have questions, please do not hesitate to call me. I look forward to following Caitlin along with you.         Sincerely,        Fernando Ribeiro MD        CC:   No Recipients    Fernando Ribeiro MD  07/11/23 1541  Sign when Signing Visit  Chief Complaint   Patient presents with   • Back Pain       Patient ID: Caitlin Olivera is a 62 y.o. female.    HPI:  Ms. Caitlin Olivera is a 62-year-old female presenting for follow-up. She had resolved facial pain and numbness as well as chronic low back pain with some burning and tingling that was worsened after a fall 4 months ago.    The patient reports that she no longer has facial numbness and pain. She believes this was brought on by her shingles. She denies any headaches normally.    She denies any lower back pain currently. She has bursitis in her right hip, which sometimes causes pain. She confirms she has weakness in her legs. She states her bilateral knees need to be replaced, and her bilateral feet need operating on. Her feet were \"crushed\" approximately 1 year ago accidentally. She gets injections from her podiatrist when she needs them. She went to the [facility], and her knees feel good. She sometimes wears her braces. She states if she does not wear her braces, she is \"worn out\" by the end of the night. She notes her braces are very bothersome. She has numbness in her feet where the injury was. The numbness in her feet worsened after the injury. She has an appointment with Dr. Jack, neurosurgery, on 07/25/2023. Her left foot has been fractured twice. She had metal in it, and she notes she has a hard time flexing it up. Her left foot used to crack. She " "reports she got an MRI of her foot, but she has not seen her podiatrist to discuss the results. She states her left foot hurts all the time. She notes she walks with a limp. Sometimes her feet hurt so badly, she cannot stand. She does not take any pain medication.     She occasionally experiences spasms in her legs. She reports even though she eats bananas and tomatoes, she still gets muscle cramps at night that come up her legs. She states her foot \"curls up,\" and she sometimes cannot straighten it. She had arthroscopic surgery on her left knee for a chipped bone 23 years ago, but reports her right knee is now worse. She states when she goes up steps, she is putting all of her weight on her ***(laterality unspecified) foot. She previously had lower back pain that radiated down her legs. She has had a couple of falls. She states her ***(laterality unspecified) big toe hurts. The patient notes she also needs to lose weight but adds the medicines she is taking are not helping. The patient states she does not wish to have back surgery. She believes her leg symptoms are caused by her knees.    The patient has been under significant stress. She now has someone come in to help care for her . The patient has started taking Rexulti within the past month and \"loves it.\"     She reports she is experiencing vaginal bleeding. She is seeing Dr. Gonzalez, gynecology. She states Dr. Gonzalez had to use 2 different instruments because her cervix was so thick. She has to take medicine to open her cervix. She is hoping her cervix will open so she can complete a biopsy.     The following portions of the patient's history were reviewed and updated as appropriate: allergies, current medications, past family history, past medical history, past social history, past surgical history and problem list.    Interval history:    Review of Systems   Musculoskeletal:  Negative for back pain, neck pain and neck stiffness. "   Allergic/Immunologic: Negative for environmental allergies, food allergies and immunocompromised state.   Neurological:  Negative for dizziness, tremors, seizures, syncope, facial asymmetry, speech difficulty, weakness, light-headedness, numbness and headaches.    ***    There were no vitals filed for this visit.    Neurologic Exam     Mental Status   Speech: speech is normal   Level of consciousness: alert    Cranial Nerves     CN II   Visual fields full to confrontation.     CN III, IV, VI   Pupils are equal, round, and reactive to light.  Extraocular motions are normal.     CN V   Facial sensation intact.     CN VII   Facial expression full, symmetric.     CN VIII   Hearing: intact    CN IX, X   Palate: symmetric    CN XI   Right trapezius strength: normal  Left trapezius strength: normal    CN XII   Tongue: not atrophic  Fasciculations: absent  Tongue deviation: none    Motor Exam   Muscle bulk: normal    Strength   Right deltoid: 5/5  Left deltoid: 5/5  Right biceps: 5/5  Left biceps: 5/5  Right triceps: 5/5  Left triceps: 5/5  Right iliopsoas: 5/5  Left iliopsoas: 5/5  Right quadriceps: 5/5  Left quadriceps: 5/5  Right hamstrin/5  Left hamstrin/5  Right anterior tibial: 5/5  Left anterior tibial: 5/5  Right gastroc: 5/5  Left gastroc: 5/5Grip 5 out of 5 bilaterally     Sensory Exam   Right arm light touch: normal  Left arm light touch: normal  Right leg light touch: normal  Left leg light touch: normal  Right leg vibration: decreased from ankle  Left leg vibration: decreased from toes  Mild decrease right ankle to vibration, absent left toes, decreased right toes.     Gait, Coordination, and Reflexes     Coordination   Finger to nose coordination: normal  Heel to shin coordination: normal    Reflexes   Right brachioradialis: 2+  Left brachioradialis: 2+  Right biceps: 2+  Left biceps: 2+  Right patellar: 3+  Left patellar: 3+  Right achilles: 2+  Left achilles: 2+Toes neutral     Physical Exam  Eyes:  "     Extraocular Movements: EOM normal.      Pupils: Pupils are equal, round, and reactive to light.   Neurological:      Coordination: Finger-Nose-Finger Test and Heel to Shin Test normal.      Deep Tendon Reflexes:      Reflex Scores:       Bicep reflexes are 2+ on the right side and 2+ on the left side.       Brachioradialis reflexes are 2+ on the right side and 2+ on the left side.       Patellar reflexes are 3+ on the right side and 3+ on the left side.       Achilles reflexes are 2+ on the right side and 2+ on the left side.  Psychiatric:         Speech: Speech normal.       Procedures    Assessment/Plan:         There are no diagnoses linked to this encounter.       Shelby Ramos MA      The reason for initial consult by Dr. Laws has resolved and remains resolved. I worked up what sounded like chronic back pain with exacerbation. She denies back pain on today's visit, but she just continues to have hyperreflexia with the possibility of spinal stenosis. I would like for her to see neurosurgery, and she has an appointment scheduled. I have advised her to keep it to see if they would want to do anything or further monitoring, but from a neurology standpoint, I think that she can return to clinic as needed as she has had resolution of numerous of her symptoms. She can return to clinic as needed. We reviewed MRI of the lumbar spine results.    Transcribed from ambient dictation for Fernando Ribeiro MD by Michelle Dhaliwal.  07/11/23   17:37 EDT    {[facility] Provider Statement:[id number]::\"Patient or patient representative verbalized consent to the visit recording.\",\"I have personally performed the services described in this document as transcribed by the above individual, and it is both accurate and complete.\"}   "

## 2023-07-11 NOTE — PROGRESS NOTES
"Chief Complaint   Patient presents with    Back Pain       Patient ID: Caitlin Olivera is a 62 y.o. female.    HPI:  Interval history:  Ms. Caitlin Olivera is a 62-year-old female presenting for follow-up. She had resolved facial pain and numbness as well as chronic low back pain with some burning and tingling that was worsened after a fall 4 months ago.    The patient reports that she no longer has facial numbness and pain. She believes this was brought on by her shingles. She denies any headaches normally.    She denies any lower back pain currently. She has bursitis in her right hip, which sometimes causes pain. She confirms she has weakness in her legs. She states her bilateral knees need to be replaced, and her bilateral feet need operating on. Her feet were \"crushed\" approximately 1 year ago accidentally. She gets injections from her podiatrist when she needs them. She went to the [facility], and her knees feel good. She sometimes wears her braces. She states if she does not wear her braces, she is \"worn out\" by the end of the night. She notes her braces are very bothersome. She has numbness in her feet where the injury was. The numbness in her feet worsened after the injury. She has an appointment with Dr. Jack, neurosurgery, on 07/25/2023. Her left foot has been fractured twice. She had metal in it, and she notes she has a hard time flexing it up. Her left foot used to crack. She reports she got an MRI of her foot, but she has not seen her podiatrist to discuss the results. She states her left foot hurts all the time. She notes she walks with a limp. Sometimes her feet hurt so badly, she cannot stand. She does not take any pain medication.     She occasionally experiences spasms in her legs. She reports even though she eats bananas and tomatoes, she still gets muscle cramps at night that come up her legs. She states her foot \"curls up,\" and she sometimes cannot straighten it. She had " "arthroscopic surgery on her left knee for a chipped bone 23 years ago, but reports her right knee is now worse. She states when she goes up steps, she is putting all of her weight on her one of her feet. She previously had lower back pain that radiated down her legs. She has had a couple of falls. She states her one of her big toes can hurt. The patient notes she also needs to lose weight but adds the medicines she is taking are not helping. The patient states she does not wish to have back surgery. She believes her leg symptoms are caused by her knees.    The patient has been under significant stress. She now has someone come in to help care for her . The patient has started taking Rexulti within the past month and \"loves it.\"     She reports she is experiencing vaginal bleeding. She is seeing Dr. Gonzalez, gynecology. She is trying to get a biopsy.    The following portions of the patient's history were reviewed and updated as appropriate: allergies, current medications, past family history, past medical history, past social history, past surgical history and problem list.        Review of Systems   Musculoskeletal:  Negative for back pain, neck pain and neck stiffness.   Allergic/Immunologic: Negative for environmental allergies, food allergies and immunocompromised state.   Neurological:  Negative for dizziness, tremors, seizures, syncope, facial asymmetry, speech difficulty, weakness, light-headedness, numbness and headaches.         There were no vitals filed for this visit.    Neurologic Exam     Mental Status   Speech: speech is normal   Level of consciousness: alert    Cranial Nerves     CN II   Visual fields full to confrontation.     CN III, IV, VI   Pupils are equal, round, and reactive to light.  Extraocular motions are normal.     CN V   Facial sensation intact.     CN VII   Facial expression full, symmetric.     CN VIII   Hearing: intact    CN IX, X   Palate: symmetric    CN XI   Right " trapezius strength: normal  Left trapezius strength: normal    CN XII   Tongue: not atrophic  Fasciculations: absent  Tongue deviation: none    Motor Exam   Muscle bulk: normal    Strength   Right deltoid: 5/5  Left deltoid: 5/5  Right biceps: 5/5  Left biceps: 5/5  Right triceps: 5/5  Left triceps: 5/5  Right iliopsoas: 5/5  Left iliopsoas: 5/5  Right quadriceps: 5/5  Left quadriceps: 5/5  Right hamstrin/5  Left hamstrin/5  Right anterior tibial: 5/5  Left anterior tibial: 5/5  Right gastroc: 5/5  Left gastroc: 5/5Grip 5 out of 5 bilaterally     Sensory Exam   Right arm light touch: normal  Left arm light touch: normal  Right leg light touch: normal  Left leg light touch: normal  Right leg vibration: decreased from ankle  Left leg vibration: decreased from toes  Mild decrease right ankle to vibration, absent left toes, decreased right toes.     Gait, Coordination, and Reflexes     Coordination   Finger to nose coordination: normal  Heel to shin coordination: normal    Reflexes   Right brachioradialis: 2+  Left brachioradialis: 2+  Right biceps: 2+  Left biceps: 2+  Right patellar: 3+  Left patellar: 3+  Right achilles: 2+  Left achilles: 2+Toes neutral     Physical Exam  Eyes:      Extraocular Movements: EOM normal.      Pupils: Pupils are equal, round, and reactive to light.   Neurological:      Coordination: Finger-Nose-Finger Test and Heel to Shin Test normal.      Deep Tendon Reflexes:      Reflex Scores:       Bicep reflexes are 2+ on the right side and 2+ on the left side.       Brachioradialis reflexes are 2+ on the right side and 2+ on the left side.       Patellar reflexes are 3+ on the right side and 3+ on the left side.       Achilles reflexes are 2+ on the right side and 2+ on the left side.  Psychiatric:         Speech: Speech normal.       Procedures    Assessment/Plan:         There are no diagnoses linked to this encounter.       Shelby Ramos MA      The reason for initial consult by   Telang has resolved and remains resolved. I worked up what sounded like chronic back pain with exacerbation. She denies back pain on today's visit, but she just continues to have hyperreflexia with the possibility of spinal stenosis. I would like for her to see neurosurgery, and she has an appointment scheduled. I have advised her to keep it to see if they would want to do anything or further monitoring, but from a neurology standpoint, I think that she can return to clinic as needed as she has had resolution of numerous of her symptoms. She can return to clinic as needed. We reviewed MRI of the lumbar spine results.  I spent 27 minutes caring for this patient on this date of service. This time includes time spent by me in the following activities as necessary: preparing for the visit, reviewing tests, medical records and previous visits, obtaining and/or reviewing a separately obtained history, performing a medically appropriate exam and/or evaluation, counseling and educating the patient, and/or communicating with other healthcare professionals, documenting information in the medical record, independently interpreting results and communicating that information with the patient, and developing a medically appropriate treatment plan with consideration of other conditions, medications, and treatments.    Transcribed from ambient dictation for Fernando Ribeiro MD by Michelle Dhaliwal.  07/11/23   17:37 EDT    Patient or patient representative verbalized consent to the visit recording.  I have personally performed the services described in this document as transcribed by the above individual, and it is both accurate and complete.  Fernando Ribeiro MD  7/27/2023  08:00 EDT

## 2024-12-01 ENCOUNTER — HOSPITAL ENCOUNTER (INPATIENT)
Facility: HOSPITAL | Age: 64
LOS: 3 days | Discharge: HOME | End: 2024-12-05
Attending: HOSPITALIST | Admitting: HOSPITALIST
Payer: MEDICARE

## 2024-12-01 ENCOUNTER — APPOINTMENT (OUTPATIENT)
Dept: OTHER | Facility: HOSPITAL | Age: 64
End: 2024-12-01
Payer: MEDICARE

## 2024-12-01 DIAGNOSIS — K80.45 CALCULUS OF BILE DUCT WITH CHRONIC CHOLECYSTITIS WITH OBSTRUCTION: Primary | ICD-10-CM

## 2024-12-01 DIAGNOSIS — K83.1 BILIARY TRACT OBSTRUCTION: ICD-10-CM

## 2024-12-01 PROBLEM — E66.9 OBESITY (BMI 30-39.9): Status: ACTIVE | Noted: 2024-12-01

## 2024-12-01 PROBLEM — K80.51 CALCULUS OF BILE DUCT WITHOUT CHOLECYSTITIS WITH OBSTRUCTION: Status: ACTIVE | Noted: 2024-12-01

## 2024-12-01 PROCEDURE — G0378 HOSPITAL OBSERVATION PER HR: HCPCS

## 2024-12-01 RX ORDER — POLYETHYLENE GLYCOL 3350 17 G/17G
17 POWDER, FOR SOLUTION ORAL DAILY PRN
Status: DISCONTINUED | OUTPATIENT
Start: 2024-12-01 | End: 2024-12-05 | Stop reason: HOSPADM

## 2024-12-01 RX ORDER — MORPHINE SULFATE 2 MG/ML
2 INJECTION, SOLUTION INTRAMUSCULAR; INTRAVENOUS
Status: DISCONTINUED | OUTPATIENT
Start: 2024-12-01 | End: 2024-12-05 | Stop reason: HOSPADM

## 2024-12-01 RX ORDER — BISACODYL 5 MG/1
5 TABLET, DELAYED RELEASE ORAL DAILY PRN
Status: DISCONTINUED | OUTPATIENT
Start: 2024-12-01 | End: 2024-12-05 | Stop reason: HOSPADM

## 2024-12-01 RX ORDER — BISACODYL 10 MG
10 SUPPOSITORY, RECTAL RECTAL DAILY PRN
Status: DISCONTINUED | OUTPATIENT
Start: 2024-12-01 | End: 2024-12-05 | Stop reason: HOSPADM

## 2024-12-01 RX ORDER — ONDANSETRON 2 MG/ML
4 INJECTION INTRAMUSCULAR; INTRAVENOUS EVERY 6 HOURS PRN
Status: DISCONTINUED | OUTPATIENT
Start: 2024-12-01 | End: 2024-12-02 | Stop reason: SDUPTHER

## 2024-12-01 RX ORDER — SODIUM CHLORIDE 0.9 % (FLUSH) 0.9 %
10 SYRINGE (ML) INJECTION AS NEEDED
Status: DISCONTINUED | OUTPATIENT
Start: 2024-12-01 | End: 2024-12-02

## 2024-12-01 RX ORDER — ONDANSETRON 4 MG/1
4 TABLET, ORALLY DISINTEGRATING ORAL EVERY 6 HOURS PRN
Status: DISCONTINUED | OUTPATIENT
Start: 2024-12-01 | End: 2024-12-02 | Stop reason: SDUPTHER

## 2024-12-01 RX ORDER — AMOXICILLIN 250 MG
2 CAPSULE ORAL 2 TIMES DAILY PRN
Status: DISCONTINUED | OUTPATIENT
Start: 2024-12-01 | End: 2024-12-05 | Stop reason: HOSPADM

## 2024-12-01 RX ORDER — ALUMINA, MAGNESIA, AND SIMETHICONE 2400; 2400; 240 MG/30ML; MG/30ML; MG/30ML
15 SUSPENSION ORAL EVERY 6 HOURS PRN
Status: DISCONTINUED | OUTPATIENT
Start: 2024-12-01 | End: 2024-12-05 | Stop reason: HOSPADM

## 2024-12-02 ENCOUNTER — ANESTHESIA EVENT (OUTPATIENT)
Dept: GASTROENTEROLOGY | Facility: HOSPITAL | Age: 64
End: 2024-12-02
Payer: MEDICARE

## 2024-12-02 ENCOUNTER — INPATIENT HOSPITAL (OUTPATIENT)
Age: 64
End: 2024-12-02
Payer: MEDICARE

## 2024-12-02 ENCOUNTER — APPOINTMENT (OUTPATIENT)
Dept: GENERAL RADIOLOGY | Facility: HOSPITAL | Age: 64
End: 2024-12-02
Payer: MEDICARE

## 2024-12-02 ENCOUNTER — ANESTHESIA (OUTPATIENT)
Dept: GASTROENTEROLOGY | Facility: HOSPITAL | Age: 64
End: 2024-12-02
Payer: MEDICARE

## 2024-12-02 ENCOUNTER — APPOINTMENT (OUTPATIENT)
Dept: MRI IMAGING | Facility: HOSPITAL | Age: 64
End: 2024-12-02
Payer: MEDICARE

## 2024-12-02 ENCOUNTER — INPATIENT HOSPITAL (OUTPATIENT)
Dept: URBAN - METROPOLITAN AREA HOSPITAL 113 | Facility: HOSPITAL | Age: 64
End: 2024-12-02
Payer: MEDICARE

## 2024-12-02 DIAGNOSIS — K80.50 CALCULUS OF BILE DUCT WITHOUT CHOLANGITIS OR CHOLECYSTITIS W: ICD-10-CM

## 2024-12-02 PROBLEM — K81.9 CHOLECYSTITIS: Status: ACTIVE | Noted: 2024-12-02

## 2024-12-02 LAB
ALBUMIN SERPL-MCNC: 3.7 G/DL (ref 3.5–5.2)
ALBUMIN SERPL-MCNC: 3.7 G/DL (ref 3.5–5.2)
ALBUMIN/GLOB SERPL: 1.4 G/DL
ALBUMIN/GLOB SERPL: 1.5 G/DL
ALP SERPL-CCNC: 230 U/L (ref 39–117)
ALP SERPL-CCNC: 237 U/L (ref 39–117)
ALT SERPL W P-5'-P-CCNC: 397 U/L (ref 1–33)
ALT SERPL W P-5'-P-CCNC: 440 U/L (ref 1–33)
ANION GAP SERPL CALCULATED.3IONS-SCNC: 11 MMOL/L (ref 5–15)
ANION GAP SERPL CALCULATED.3IONS-SCNC: 13.5 MMOL/L (ref 5–15)
AST SERPL-CCNC: 195 U/L (ref 1–32)
AST SERPL-CCNC: 223 U/L (ref 1–32)
BASOPHILS # BLD AUTO: 0.02 10*3/MM3 (ref 0–0.2)
BASOPHILS # BLD AUTO: 0.03 10*3/MM3 (ref 0–0.2)
BASOPHILS NFR BLD AUTO: 0.2 % (ref 0–1.5)
BASOPHILS NFR BLD AUTO: 0.2 % (ref 0–1.5)
BILIRUB SERPL-MCNC: 6.1 MG/DL (ref 0–1.2)
BILIRUB SERPL-MCNC: 6.5 MG/DL (ref 0–1.2)
BUN SERPL-MCNC: 14 MG/DL (ref 8–23)
BUN SERPL-MCNC: 15 MG/DL (ref 8–23)
BUN/CREAT SERPL: 13.9 (ref 7–25)
BUN/CREAT SERPL: 15.2 (ref 7–25)
CALCIUM SPEC-SCNC: 9.4 MG/DL (ref 8.6–10.5)
CALCIUM SPEC-SCNC: 9.6 MG/DL (ref 8.6–10.5)
CHLORIDE SERPL-SCNC: 100 MMOL/L (ref 98–107)
CHLORIDE SERPL-SCNC: 101 MMOL/L (ref 98–107)
CO2 SERPL-SCNC: 23.5 MMOL/L (ref 22–29)
CO2 SERPL-SCNC: 25 MMOL/L (ref 22–29)
CREAT SERPL-MCNC: 0.99 MG/DL (ref 0.57–1)
CREAT SERPL-MCNC: 1.01 MG/DL (ref 0.57–1)
DEPRECATED RDW RBC AUTO: 41.2 FL (ref 37–54)
DEPRECATED RDW RBC AUTO: 41.5 FL (ref 37–54)
EGFRCR SERPLBLD CKD-EPI 2021: 63.1 ML/MIN/1.73
EGFRCR SERPLBLD CKD-EPI 2021: 64.6 ML/MIN/1.73
EOSINOPHIL # BLD AUTO: 0.35 10*3/MM3 (ref 0–0.4)
EOSINOPHIL # BLD AUTO: 0.37 10*3/MM3 (ref 0–0.4)
EOSINOPHIL NFR BLD AUTO: 2.8 % (ref 0.3–6.2)
EOSINOPHIL NFR BLD AUTO: 2.8 % (ref 0.3–6.2)
ERYTHROCYTE [DISTWIDTH] IN BLOOD BY AUTOMATED COUNT: 13.2 % (ref 12.3–15.4)
ERYTHROCYTE [DISTWIDTH] IN BLOOD BY AUTOMATED COUNT: 13.3 % (ref 12.3–15.4)
GLOBULIN UR ELPH-MCNC: 2.5 GM/DL
GLOBULIN UR ELPH-MCNC: 2.6 GM/DL
GLUCOSE SERPL-MCNC: 79 MG/DL (ref 65–99)
GLUCOSE SERPL-MCNC: 89 MG/DL (ref 65–99)
HCT VFR BLD AUTO: 38.7 % (ref 34–46.6)
HCT VFR BLD AUTO: 38.8 % (ref 34–46.6)
HGB BLD-MCNC: 12.4 G/DL (ref 12–15.9)
HGB BLD-MCNC: 12.5 G/DL (ref 12–15.9)
IMM GRANULOCYTES # BLD AUTO: 0.08 10*3/MM3 (ref 0–0.05)
IMM GRANULOCYTES # BLD AUTO: 0.08 10*3/MM3 (ref 0–0.05)
IMM GRANULOCYTES NFR BLD AUTO: 0.6 % (ref 0–0.5)
IMM GRANULOCYTES NFR BLD AUTO: 0.6 % (ref 0–0.5)
INR PPP: 1.08 (ref 0.9–1.1)
INR PPP: 1.1 (ref 0.9–1.1)
LYMPHOCYTES # BLD AUTO: 1.29 10*3/MM3 (ref 0.7–3.1)
LYMPHOCYTES # BLD AUTO: 1.33 10*3/MM3 (ref 0.7–3.1)
LYMPHOCYTES NFR BLD AUTO: 10.1 % (ref 19.6–45.3)
LYMPHOCYTES NFR BLD AUTO: 10.4 % (ref 19.6–45.3)
MCH RBC QN AUTO: 27.2 PG (ref 26.6–33)
MCH RBC QN AUTO: 27.8 PG (ref 26.6–33)
MCHC RBC AUTO-ENTMCNC: 32 G/DL (ref 31.5–35.7)
MCHC RBC AUTO-ENTMCNC: 32.3 G/DL (ref 31.5–35.7)
MCV RBC AUTO: 85.1 FL (ref 79–97)
MCV RBC AUTO: 86.2 FL (ref 79–97)
MONOCYTES # BLD AUTO: 1.33 10*3/MM3 (ref 0.1–0.9)
MONOCYTES # BLD AUTO: 1.34 10*3/MM3 (ref 0.1–0.9)
MONOCYTES NFR BLD AUTO: 10.1 % (ref 5–12)
MONOCYTES NFR BLD AUTO: 10.8 % (ref 5–12)
NEUTROPHILS NFR BLD AUTO: 10.04 10*3/MM3 (ref 1.7–7)
NEUTROPHILS NFR BLD AUTO: 75.2 % (ref 42.7–76)
NEUTROPHILS NFR BLD AUTO: 76.2 % (ref 42.7–76)
NEUTROPHILS NFR BLD AUTO: 9.3 10*3/MM3 (ref 1.7–7)
NRBC BLD AUTO-RTO: 0 /100 WBC (ref 0–0.2)
NRBC BLD AUTO-RTO: 0 /100 WBC (ref 0–0.2)
PLATELET # BLD AUTO: 272 10*3/MM3 (ref 140–450)
PLATELET # BLD AUTO: 272 10*3/MM3 (ref 140–450)
PMV BLD AUTO: 8.7 FL (ref 6–12)
PMV BLD AUTO: 9.1 FL (ref 6–12)
POTASSIUM SERPL-SCNC: 3.7 MMOL/L (ref 3.5–5.2)
POTASSIUM SERPL-SCNC: 4 MMOL/L (ref 3.5–5.2)
PROT SERPL-MCNC: 6.2 G/DL (ref 6–8.5)
PROT SERPL-MCNC: 6.3 G/DL (ref 6–8.5)
PROTHROMBIN TIME: 14.2 SECONDS (ref 11.7–14.2)
PROTHROMBIN TIME: 14.4 SECONDS (ref 11.7–14.2)
RBC # BLD AUTO: 4.49 10*6/MM3 (ref 3.77–5.28)
RBC # BLD AUTO: 4.56 10*6/MM3 (ref 3.77–5.28)
SODIUM SERPL-SCNC: 137 MMOL/L (ref 136–145)
SODIUM SERPL-SCNC: 137 MMOL/L (ref 136–145)
WBC NRBC COR # BLD AUTO: 12.38 10*3/MM3 (ref 3.4–10.8)
WBC NRBC COR # BLD AUTO: 13.18 10*3/MM3 (ref 3.4–10.8)

## 2024-12-02 PROCEDURE — 74183 MRI ABD W/O CNTR FLWD CNTR: CPT

## 2024-12-02 PROCEDURE — 25010000002 PIPERACILLIN SOD-TAZOBACTAM PER 1 G: Performed by: INTERNAL MEDICINE

## 2024-12-02 PROCEDURE — 25010000002 DEXAMETHASONE PER 1 MG: Performed by: ANESTHESIOLOGY

## 2024-12-02 PROCEDURE — 0FC98ZZ EXTIRPATION OF MATTER FROM COMMON BILE DUCT, VIA NATURAL OR ARTIFICIAL OPENING ENDOSCOPIC: ICD-10-PCS | Performed by: INTERNAL MEDICINE

## 2024-12-02 PROCEDURE — 25010000002 PIPERACILLIN SOD-TAZOBACTAM PER 1 G: Performed by: HOSPITALIST

## 2024-12-02 PROCEDURE — 85610 PROTHROMBIN TIME: CPT | Performed by: HOSPITALIST

## 2024-12-02 PROCEDURE — 43262 ENDO CHOLANGIOPANCREATOGRAPH: CPT | Mod: 59 | Performed by: INTERNAL MEDICINE

## 2024-12-02 PROCEDURE — 43264 ERCP REMOVE DUCT CALCULI: CPT | Performed by: INTERNAL MEDICINE

## 2024-12-02 PROCEDURE — 25010000002 MORPHINE PER 10 MG: Performed by: NURSE PRACTITIONER

## 2024-12-02 PROCEDURE — 25810000003 LACTATED RINGERS PER 1000 ML: Performed by: INTERNAL MEDICINE

## 2024-12-02 PROCEDURE — A9577 INJ MULTIHANCE: HCPCS | Performed by: HOSPITALIST

## 2024-12-02 PROCEDURE — 74328 X-RAY BILE DUCT ENDOSCOPY: CPT

## 2024-12-02 PROCEDURE — 99222 1ST HOSP IP/OBS MODERATE 55: CPT

## 2024-12-02 PROCEDURE — 25010000002 SUCCINYLCHOLINE PER 20 MG: Performed by: ANESTHESIOLOGY

## 2024-12-02 PROCEDURE — 25810000003 LACTATED RINGERS PER 1000 ML: Performed by: HOSPITALIST

## 2024-12-02 PROCEDURE — 25010000002 PROPOFOL 10 MG/ML EMULSION: Performed by: ANESTHESIOLOGY

## 2024-12-02 PROCEDURE — 25510000002 GADOBENATE DIMEGLUMINE 529 MG/ML SOLUTION: Performed by: HOSPITALIST

## 2024-12-02 PROCEDURE — C1769 GUIDE WIRE: HCPCS | Performed by: INTERNAL MEDICINE

## 2024-12-02 PROCEDURE — 25010000002 SUGAMMADEX 200 MG/2ML SOLUTION: Performed by: ANESTHESIOLOGY

## 2024-12-02 PROCEDURE — 85025 COMPLETE CBC W/AUTO DIFF WBC: CPT | Performed by: NURSE PRACTITIONER

## 2024-12-02 PROCEDURE — 25010000002 PHENYLEPHRINE 10 MG/ML SOLUTION: Performed by: ANESTHESIOLOGY

## 2024-12-02 PROCEDURE — 25510000001 IOPAMIDOL 61 % SOLUTION: Performed by: INTERNAL MEDICINE

## 2024-12-02 PROCEDURE — 80053 COMPREHEN METABOLIC PANEL: CPT | Performed by: NURSE PRACTITIONER

## 2024-12-02 PROCEDURE — 80053 COMPREHEN METABOLIC PANEL: CPT | Performed by: HOSPITALIST

## 2024-12-02 PROCEDURE — 25010000002 LIDOCAINE 2% SOLUTION: Performed by: ANESTHESIOLOGY

## 2024-12-02 PROCEDURE — 85025 COMPLETE CBC W/AUTO DIFF WBC: CPT | Performed by: HOSPITALIST

## 2024-12-02 PROCEDURE — 25010000002 ONDANSETRON PER 1 MG

## 2024-12-02 PROCEDURE — 85610 PROTHROMBIN TIME: CPT | Performed by: NURSE PRACTITIONER

## 2024-12-02 PROCEDURE — 25810000003 SODIUM CHLORIDE 0.9 % SOLUTION: Performed by: HOSPITALIST

## 2024-12-02 PROCEDURE — 25010000002 MORPHINE PER 10 MG: Performed by: SURGERY

## 2024-12-02 RX ORDER — DULOXETIN HYDROCHLORIDE 60 MG/1
120 CAPSULE, DELAYED RELEASE ORAL DAILY
Status: DISCONTINUED | OUTPATIENT
Start: 2024-12-02 | End: 2024-12-05 | Stop reason: HOSPADM

## 2024-12-02 RX ORDER — FAMOTIDINE 10 MG/ML
20 INJECTION, SOLUTION INTRAVENOUS ONCE
Status: CANCELLED | OUTPATIENT
Start: 2024-12-02 | End: 2024-12-02

## 2024-12-02 RX ORDER — DULOXETIN HYDROCHLORIDE 60 MG/1
120 CAPSULE, DELAYED RELEASE ORAL DAILY
COMMUNITY

## 2024-12-02 RX ORDER — FENTANYL CITRATE 50 UG/ML
50 INJECTION, SOLUTION INTRAMUSCULAR; INTRAVENOUS ONCE AS NEEDED
Status: CANCELLED | OUTPATIENT
Start: 2024-12-02

## 2024-12-02 RX ORDER — SODIUM CHLORIDE 0.9 % (FLUSH) 0.9 %
3 SYRINGE (ML) INJECTION EVERY 12 HOURS SCHEDULED
Status: CANCELLED | OUTPATIENT
Start: 2024-12-02

## 2024-12-02 RX ORDER — HYDROXYZINE HYDROCHLORIDE 50 MG/1
150 TABLET, FILM COATED ORAL DAILY
COMMUNITY

## 2024-12-02 RX ORDER — HYDROXYZINE PAMOATE 25 MG/1
25 CAPSULE ORAL EVERY 8 HOURS
Status: DISCONTINUED | OUTPATIENT
Start: 2024-12-02 | End: 2024-12-02

## 2024-12-02 RX ORDER — HYDROXYZINE HYDROCHLORIDE 25 MG/1
50 TABLET, FILM COATED ORAL 4 TIMES DAILY PRN
Status: DISCONTINUED | OUTPATIENT
Start: 2024-12-02 | End: 2024-12-05 | Stop reason: HOSPADM

## 2024-12-02 RX ORDER — ONDANSETRON 2 MG/ML
INJECTION INTRAMUSCULAR; INTRAVENOUS
Status: COMPLETED
Start: 2024-12-02 | End: 2024-12-02

## 2024-12-02 RX ORDER — FAMOTIDINE 10 MG/ML
20 INJECTION, SOLUTION INTRAVENOUS EVERY 12 HOURS SCHEDULED
Status: DISCONTINUED | OUTPATIENT
Start: 2024-12-02 | End: 2024-12-05 | Stop reason: HOSPADM

## 2024-12-02 RX ORDER — LIDOCAINE HYDROCHLORIDE 20 MG/ML
INJECTION, SOLUTION INFILTRATION; PERINEURAL AS NEEDED
Status: DISCONTINUED | OUTPATIENT
Start: 2024-12-02 | End: 2024-12-02 | Stop reason: SURG

## 2024-12-02 RX ORDER — MIDAZOLAM HYDROCHLORIDE 1 MG/ML
1 INJECTION, SOLUTION INTRAMUSCULAR; INTRAVENOUS
Status: CANCELLED | OUTPATIENT
Start: 2024-12-02

## 2024-12-02 RX ORDER — SODIUM CHLORIDE 9 MG/ML
100 INJECTION, SOLUTION INTRAVENOUS CONTINUOUS
Status: DISCONTINUED | OUTPATIENT
Start: 2024-12-02 | End: 2024-12-03

## 2024-12-02 RX ORDER — BREXPIPRAZOLE 2 MG/1
2 TABLET ORAL DAILY
COMMUNITY

## 2024-12-02 RX ORDER — DEXAMETHASONE SODIUM PHOSPHATE 4 MG/ML
INJECTION, SOLUTION INTRA-ARTICULAR; INTRALESIONAL; INTRAMUSCULAR; INTRAVENOUS; SOFT TISSUE AS NEEDED
Status: DISCONTINUED | OUTPATIENT
Start: 2024-12-02 | End: 2024-12-02 | Stop reason: SURG

## 2024-12-02 RX ORDER — ONDANSETRON 4 MG/1
4 TABLET, ORALLY DISINTEGRATING ORAL EVERY 6 HOURS PRN
Status: DISCONTINUED | OUTPATIENT
Start: 2024-12-02 | End: 2024-12-05 | Stop reason: HOSPADM

## 2024-12-02 RX ORDER — DEXTROSE MONOHYDRATE, SODIUM CHLORIDE, AND POTASSIUM CHLORIDE 50; 1.49; 4.5 G/1000ML; G/1000ML; G/1000ML
50 INJECTION, SOLUTION INTRAVENOUS CONTINUOUS
Status: DISCONTINUED | OUTPATIENT
Start: 2024-12-02 | End: 2024-12-02

## 2024-12-02 RX ORDER — INDOMETHACIN 100 MG
SUPPOSITORY, RECTAL RECTAL AS NEEDED
Status: DISCONTINUED | OUTPATIENT
Start: 2024-12-02 | End: 2024-12-02 | Stop reason: HOSPADM

## 2024-12-02 RX ORDER — SODIUM CHLORIDE 0.9 % (FLUSH) 0.9 %
3-10 SYRINGE (ML) INJECTION AS NEEDED
Status: CANCELLED | OUTPATIENT
Start: 2024-12-02

## 2024-12-02 RX ORDER — ONDANSETRON 2 MG/ML
4 INJECTION INTRAMUSCULAR; INTRAVENOUS EVERY 6 HOURS PRN
Status: DISCONTINUED | OUTPATIENT
Start: 2024-12-02 | End: 2024-12-05 | Stop reason: HOSPADM

## 2024-12-02 RX ORDER — PHENYLEPHRINE HYDROCHLORIDE 10 MG/ML
INJECTION INTRAVENOUS AS NEEDED
Status: DISCONTINUED | OUTPATIENT
Start: 2024-12-02 | End: 2024-12-02 | Stop reason: SURG

## 2024-12-02 RX ORDER — SODIUM CHLORIDE, SODIUM LACTATE, POTASSIUM CHLORIDE, CALCIUM CHLORIDE 600; 310; 30; 20 MG/100ML; MG/100ML; MG/100ML; MG/100ML
1000 INJECTION, SOLUTION INTRAVENOUS CONTINUOUS
Status: ACTIVE | OUTPATIENT
Start: 2024-12-02 | End: 2024-12-02

## 2024-12-02 RX ORDER — IOPAMIDOL 612 MG/ML
INJECTION, SOLUTION INTRAVASCULAR AS NEEDED
Status: DISCONTINUED | OUTPATIENT
Start: 2024-12-02 | End: 2024-12-02 | Stop reason: HOSPADM

## 2024-12-02 RX ORDER — SODIUM CHLORIDE, SODIUM LACTATE, POTASSIUM CHLORIDE, CALCIUM CHLORIDE 600; 310; 30; 20 MG/100ML; MG/100ML; MG/100ML; MG/100ML
9 INJECTION, SOLUTION INTRAVENOUS CONTINUOUS
Status: CANCELLED | OUTPATIENT
Start: 2024-12-02 | End: 2024-12-03

## 2024-12-02 RX ORDER — LIDOCAINE HYDROCHLORIDE 10 MG/ML
0.5 INJECTION, SOLUTION INFILTRATION; PERINEURAL ONCE AS NEEDED
Status: CANCELLED | OUTPATIENT
Start: 2024-12-02

## 2024-12-02 RX ORDER — SODIUM CHLORIDE, SODIUM LACTATE, POTASSIUM CHLORIDE, CALCIUM CHLORIDE 600; 310; 30; 20 MG/100ML; MG/100ML; MG/100ML; MG/100ML
100 INJECTION, SOLUTION INTRAVENOUS CONTINUOUS
Status: DISCONTINUED | OUTPATIENT
Start: 2024-12-02 | End: 2024-12-02 | Stop reason: SDUPTHER

## 2024-12-02 RX ORDER — LAMOTRIGINE 100 MG/1
200 TABLET ORAL DAILY
Status: DISCONTINUED | OUTPATIENT
Start: 2024-12-02 | End: 2024-12-05 | Stop reason: HOSPADM

## 2024-12-02 RX ORDER — SODIUM CHLORIDE, SODIUM LACTATE, POTASSIUM CHLORIDE, CALCIUM CHLORIDE 600; 310; 30; 20 MG/100ML; MG/100ML; MG/100ML; MG/100ML
100 INJECTION, SOLUTION INTRAVENOUS CONTINUOUS
Status: DISCONTINUED | OUTPATIENT
Start: 2024-12-02 | End: 2024-12-02

## 2024-12-02 RX ORDER — ATORVASTATIN CALCIUM 40 MG/1
40 TABLET, FILM COATED ORAL DAILY
COMMUNITY

## 2024-12-02 RX ORDER — SUCCINYLCHOLINE CHLORIDE 20 MG/ML
INJECTION INTRAMUSCULAR; INTRAVENOUS AS NEEDED
Status: DISCONTINUED | OUTPATIENT
Start: 2024-12-02 | End: 2024-12-02 | Stop reason: SURG

## 2024-12-02 RX ORDER — ACETAMINOPHEN 325 MG/1
650 TABLET ORAL EVERY 4 HOURS PRN
Status: DISCONTINUED | OUTPATIENT
Start: 2024-12-02 | End: 2024-12-05 | Stop reason: HOSPADM

## 2024-12-02 RX ORDER — PROPOFOL 10 MG/ML
VIAL (ML) INTRAVENOUS AS NEEDED
Status: DISCONTINUED | OUTPATIENT
Start: 2024-12-02 | End: 2024-12-02 | Stop reason: SURG

## 2024-12-02 RX ORDER — LAMOTRIGINE 200 MG/1
200 TABLET ORAL DAILY
COMMUNITY

## 2024-12-02 RX ADMIN — SODIUM CHLORIDE, SODIUM LACTATE, POTASSIUM CHLORIDE, CALCIUM CHLORIDE 100 ML/HR: 20; 30; 600; 310 INJECTION, SOLUTION INTRAVENOUS at 09:07

## 2024-12-02 RX ADMIN — PIPERACILLIN AND TAZOBACTAM 3.38 G: 3; .375 INJECTION, POWDER, FOR SOLUTION INTRAVENOUS at 22:10

## 2024-12-02 RX ADMIN — MORPHINE SULFATE 2 MG: 2 INJECTION, SOLUTION INTRAMUSCULAR; INTRAVENOUS at 08:28

## 2024-12-02 RX ADMIN — MORPHINE SULFATE 2 MG: 2 INJECTION, SOLUTION INTRAMUSCULAR; INTRAVENOUS at 00:49

## 2024-12-02 RX ADMIN — LIDOCAINE HYDROCHLORIDE 100 MG: 20 INJECTION, SOLUTION INFILTRATION; PERINEURAL at 17:29

## 2024-12-02 RX ADMIN — PROPOFOL 120 MG: 10 INJECTION, EMULSION INTRAVENOUS at 17:28

## 2024-12-02 RX ADMIN — DEXAMETHASONE SODIUM PHOSPHATE 8 MG: 4 INJECTION, SOLUTION INTRA-ARTICULAR; INTRALESIONAL; INTRAMUSCULAR; INTRAVENOUS; SOFT TISSUE at 17:32

## 2024-12-02 RX ADMIN — SUCCINYLCHOLINE CHLORIDE 460 MG: 20 INJECTION, SOLUTION INTRAMUSCULAR; INTRAVENOUS; PARENTERAL at 17:30

## 2024-12-02 RX ADMIN — SODIUM CHLORIDE, POTASSIUM CHLORIDE, SODIUM LACTATE AND CALCIUM CHLORIDE 1000 ML: 600; 310; 30; 20 INJECTION, SOLUTION INTRAVENOUS at 16:28

## 2024-12-02 RX ADMIN — FAMOTIDINE 20 MG: 10 INJECTION INTRAVENOUS at 21:25

## 2024-12-02 RX ADMIN — PIPERACILLIN AND TAZOBACTAM 3.38 G: 3; .375 INJECTION, POWDER, FOR SOLUTION INTRAVENOUS at 13:40

## 2024-12-02 RX ADMIN — SUGAMMADEX 400 MG: 100 INJECTION, SOLUTION INTRAVENOUS at 18:23

## 2024-12-02 RX ADMIN — SODIUM CHLORIDE 100 ML/HR: 9 INJECTION, SOLUTION INTRAVENOUS at 13:43

## 2024-12-02 RX ADMIN — HYDROXYZINE PAMOATE 25 MG: 25 CAPSULE ORAL at 06:59

## 2024-12-02 RX ADMIN — PHENYLEPHRINE HYDROCHLORIDE 200 MCG: 10 INJECTION INTRAVENOUS at 17:51

## 2024-12-02 RX ADMIN — GADOBENATE DIMEGLUMINE 16 ML: 529 INJECTION, SOLUTION INTRAVENOUS at 12:39

## 2024-12-02 RX ADMIN — ONDANSETRON 4 MG: 2 INJECTION, SOLUTION INTRAMUSCULAR; INTRAVENOUS at 16:43

## 2024-12-02 RX ADMIN — PHENYLEPHRINE HYDROCHLORIDE 200 MCG: 10 INJECTION INTRAVENOUS at 17:57

## 2024-12-02 RX ADMIN — LAMOTRIGINE 200 MG: 100 TABLET ORAL at 08:28

## 2024-12-02 RX ADMIN — FAMOTIDINE 20 MG: 10 INJECTION INTRAVENOUS at 11:44

## 2024-12-02 NOTE — PROGRESS NOTES
"    DAILY PROGRESS NOTE  [facility]    Patient Identification:  Name: Caitlin Olivera  Age: 64 y.o.  Sex: female  :  1960  MRN: [id number]         Primary Care Physician: Glory Laws MD    Subjective:  Interval History: Feeling better.  Still has discomfort but she has not eaten anything.  No issues with vomiting this morning.  Denies any chest pain troubles breathing or fever    Objective: Jaundiced with icterus.  Conversational and pleasant    Scheduled Meds:hydrOXYzine pamoate, 25 mg, Oral, Q8H  lamoTRIgine, 200 mg, Oral, Daily      Continuous Infusions:lactated ringers, 100 mL/hr, Last Rate: 100 mL/hr (24 0907)  lactated ringers, 100 mL/hr        Vital signs in last 24 hours:  Temp:  [98.5 °F (36.9 °C)-98.7 °F (37.1 °C)] 98.5 °F (36.9 °C)  Heart Rate:  [73-94] 73  Resp:  [18] 18  BP: (111-140)/(77-78) 140/77    Intake/Output:    Intake/Output Summary (Last 24 hours) at 2024 1019  Last data filed at 2024 0604  Gross per 24 hour   Intake --   Output 450 ml   Net -450 ml       Exam:  /77 (BP Location: Left arm, Patient Position: Lying)   Pulse 73   Temp 98.5 °F (36.9 °C) (Oral)   Resp 18   Ht 152.4 cm (60\")   Wt 80.6 kg (177 lb 11.1 oz)   SpO2 98%   BMI 34.70 kg/m²     General Appearance:    Alert, cooperative, nontoxic, AAOx3                          Head:    Normocephalic, without obvious abnormality, atraumatic                           Eyes:    PERRLA, positive icterus                         throat:   Oral mucosa pink and moist                           Neck:   Supple, symmetrical, trachea midline, no JVD                         Lungs:    Clear to auscultation bilaterally, respirations unlabored                 Chest Wall:    No tenderness or deformity                          Heart:    Regular rate and rhythm, S1 and S2 normal                  Abdomen:     Soft, right upper quadrant and epigastric tenderness to palpation, bowel sounds noted, no " significant rebound or guarding                 extremities: Moving all, no cyanosis or edema                        Pulses:   Pulses palpable in all extremities                            Skin:   Skin is warm and dry, jaundiced                  Neurologic:   CNII-XII intact, no focal deficits noted     Data Review:  Labs in chart were reviewed.    Assessment:  Active Hospital Problems    Diagnosis  POA    **Calculus of bile duct without cholecystitis with obstruction [K80.51]  Yes    Obesity (BMI 30-39.9) [E66.9]  Yes    Biliary tract obstruction [K83.1]  Yes      Resolved Hospital Problems   No resolved problems to display.       Plan:    Agree with IV Zosyn and will reinitiate IVF    Surgical and GI consults pending and she will need cholecystectomy and ERCP    Trend CMP for abnormal LFTs with hyperbilirubinemia    SCDs for prophylaxis    Case discussed with managing RN -she will finalize home MAR and when notified I will reconcile but currently she is n.p.o. anyways    IV Pepcid for GI PPx      Juan Wyman MD  12/2/2024  10:19 EST

## 2024-12-02 NOTE — PROGRESS NOTES
[facility] Clinical Pharmacy Services: Piperacillin-Tazobactam Consult    Pt Name: Caitlin Olivera   : 1960    Indication: Intra-Abdominal Infection    Relevant clinical data and objective history reviewed:    Past Medical History:   Diagnosis Date    Arthritis     Elevated cholesterol     Foot injury     Right  Left      Creatinine   Date Value Ref Range Status   2024 1.01 (H) 0.57 - 1.00 mg/dL Final   2024 0.99 0.57 - 1.00 mg/dL Final   2023 0.96 0.55 - 1.02 mg/dL Final   2022 0.84 0.55 - 1.02 mg/dL Final   2022 1.00 0.6 - 1.3 mg/dL Final   2019 0.92 0.50 - 1.05 mg/dL Final     Comment:       Result Comment:   For patients >49 years of age, the reference limit  for Creatinine is approximately 13% higher for people  identified as -American.   03/15/2018 0.92 0.50 - 1.05 mg/dL Final     Comment:       Result Comment:   For patients >49 years of age, the reference limit  for Creatinine is approximately 13% higher for people  identified as -American.     BUN   Date Value Ref Range Status   2024 14 8 - 23 mg/dL Final   2023 12 10 - 20 mg/dL Final     Estimated Creatinine Clearance: 54.2 mL/min (A) (by C-G formula based on SCr of 1.01 mg/dL (H)).    Lab Results   Component Value Date    WBC 13.18 (H) 2024     Temp Readings from Last 3 Encounters:   24 98.7 °F (37.1 °C) (Oral)   10/24/22 98 °F (36.7 °C) (Temporal)   10/22/22 98.4 °F (36.9 °C) (Oral)      Assessment/Plan  Estimated CrCl >20 mL/min at this time; BMI 34.7 kg/m2  Will start piperacillin-tazobactam 3.375 g IV every 8 hours     Pharmacy will continue to follow daily while on piperacillin-tazobactam and adjust as needed. Thank you for this consult.    Dakotah Sandoval [facility]  Clinical Pharmacist

## 2024-12-02 NOTE — H&P
Patient Name:  Caitlin Olivera  YOB: 1960  MRN:  [id number]  Admit Date:  12/1/2024  Patient Care Team:  Glory Laws MD as PCP - General (Family Medicine)  Isabel Christie MD as Obstetrician (Gynecology)      Subjective   History Present Illness     Chief complaint chest pain.    Ms. Olivera is a 64-year-old female complaining of substernal sharp chest pain for the last 4 days.  Symptoms started on Thanksgiving after her meal.  Symptoms are epigastric and lower chest with associated nausea but no vomiting.  She has had fever as well.  Symptoms have been persistent since that time worse today prompting ER visit.    Labs reviewed from outside facility remarkable for glucose 159, total bilirubin 4.8, alk phos 214, , , WBC 12.3.    CXR slight elevation left hemidiaphragm, likely atelectasis cannot rule out left lung basilar pneumonia.  Possible rotator cuff injuries.    Gallbladder ultrasound showed cholelithiasis without evidence of acute cholecystitis.  CBD upper limits of normal recommended MRCP.  Probable hepatic steatosis.          History of Present Illness      Review of Systems     Personal History     Past Medical History:   Diagnosis Date    Arthritis     Elevated cholesterol     Foot injury     Right 2000 Left 2014     Past Surgical History:   Procedure Laterality Date    COLONOSCOPY      FOOT SURGERY Left 2014    KNEE SURGERY  1990    WISDOM TOOTH EXTRACTION       Family History   Problem Relation Age of Onset    Cancer Mother     Colon cancer Mother     Alcohol abuse Father     Liver disease Father     No Known Problems Sister     No Known Problems Brother     Heart disease Maternal Grandfather     Breast cancer Neg Hx     Ovarian cancer Neg Hx     Uterine cancer Neg Hx     Deep vein thrombosis Neg Hx     Pulmonary embolism Neg Hx     Osteoporosis Neg Hx      Social History     Tobacco Use    Smoking status: Former     Current packs/day: 0.00     Types:  Cigarettes     Quit date:      Years since quittin.9    Smokeless tobacco: Never   Vaping Use    Vaping status: Never Used   Substance Use Topics    Alcohol use: Yes     Comment: occasionally    Drug use: No     No current facility-administered medications on file prior to encounter.     Current Outpatient Medications on File Prior to Encounter   Medication Sig Dispense Refill    amphetamine-dextroamphetamine (ADDERALL) 20 MG tablet Take 1 tablet by mouth Daily.      atorvastatin (LIPITOR) 10 MG tablet Take 1 tablet by mouth Daily.      azelastine (ASTEPRO) 0.15 % solution nasal spray 1 spray into the nostril(s) as directed by provider 2 (Two) Times a Day. (Patient taking differently: Administer 1 spray into the nostril(s) as directed by provider 2 (Two) Times a Day As Needed for Allergies.) 30 mL 0    hydrOXYzine (VISTARIL) 25 MG capsule Take 1 capsule by mouth Every 8 (Eight) Hours.      lamoTRIgine  MG tablet sustained-release 24 hour Take 200 mg by mouth Daily.      MULTIPLE VITAMIN PO Take 1 tablet by mouth Daily.      Rexulti 1 MG tablet Take 1 tablet by mouth Daily.      benzonatate (TESSALON) 200 MG capsule Take 1 capsule by mouth 3 (Three) Times a Day As Needed for Cough. 40 capsule 0    brompheniramine-pseudoephedrine-DM 30-2-10 MG/5ML syrup Take 5 mL by mouth 4 (Four) Times a Day As Needed for Cough or Allergies. 118 mL 0    BUPROPION HCL ER, SR, PO Take 450 mg by mouth. (Patient not taking: Reported on 3/20/2023)      chlorzoxazone (PARAFON FORTE) 500 MG tablet Take 1 tablet by mouth 3 (Three) Times a Day As Needed for Muscle Spasms. 30 tablet 0    cyproheptadine (PERIACTIN) 4 MG tablet Take 1 tablet by mouth 3 (Three) Times a Day As Needed for Allergies.      docusate sodium (COLACE) 100 MG capsule Take 1 capsule by mouth 2 (Two) Times a Day As Needed for Constipation. (Patient not taking: Reported on 3/20/2023) 20 capsule 0    doxycycline (ADOXA) 100 MG tablet Take 1 tablet by mouth 2  (Two) Times a Day. 20 tablet 0    DULoxetine (CYMBALTA) 60 MG capsule 1 capsule Daily.      estradiol (ESTRACE) 2 MG tablet TAKE ONE TABLET BY MOUTH DAILY 30 tablet 0    gabapentin (NEURONTIN) 400 MG capsule Take 300 mg by mouth. PCP WEANING HER OFF      Glucosamine 500 MG capsule Take 3 capsules by mouth Daily.      HYDROcodone-acetaminophen (NORCO) 5-325 MG per tablet Take 1 tablet by mouth Every 6 (Six) Hours As Needed for Severe Pain . Do not drive or mix with alcohol (Patient not taking: Reported on 3/20/2023) 12 tablet 0    lithium carbonate 300 MG capsule Take 300 mg by mouth. (Patient not taking: Reported on 3/20/2023)      Lurasidone HCl (LATUDA) 20 MG tablet tablet Take  by mouth. (Patient not taking: Reported on 3/20/2023)      medroxyPROGESTERone (PROVERA) 2.5 MG tablet Take 1 tablet by mouth Daily. 90 tablet 0    meloxicam (MOBIC) 15 MG tablet TAKE 1 TABLET EVERY DAY (WATCH FOR OVERUSE OF THIS MEDICATION, IT CAN CAUSE LONG TERM EFFECTS ON YOUR KIDNEYS) (Patient not taking: Reported on 3/20/2023)      naproxen (EC NAPROSYN) 500 MG EC tablet Take 1 tablet by mouth 2 (Two) Times a Day As Needed for Mild Pain. (Patient not taking: Reported on 3/20/2023) 20 tablet 0    omeprazole (priLOSEC) 20 MG capsule Take 1 capsule by mouth.      predniSONE (DELTASONE) 20 MG tablet Take 3 tab po qd x 2 days then take 2 tab po qd x 3 days then take 1 tab po qd x 3 days 15 tablet 0     No Known Allergies    Objective    Objective     Vital Signs  Temp:  [98.7 °F (37.1 °C)] 98.7 °F (37.1 °C)  Heart Rate:  [94] 94  Resp:  [18] 18  BP: (111)/(78) 111/78  SpO2:  [92 %] 92 %  on   ;   Device (Oxygen Therapy): room air  Body mass index is 34.7 kg/m².    Physical Exam  Vitals and nursing note reviewed.   Constitutional:       General: She is not in acute distress.     Appearance: She is obese.   HENT:      Head: Normocephalic and atraumatic.   Cardiovascular:      Rate and Rhythm: Normal rate and regular rhythm.   Pulmonary:       Effort: Pulmonary effort is normal.      Breath sounds: Normal breath sounds.   Abdominal:      General: Bowel sounds are normal. There is no distension.      Palpations: Abdomen is soft.      Tenderness: There is no abdominal tenderness.   Musculoskeletal:         General: Normal range of motion.   Skin:     General: Skin is warm and dry.   Neurological:      Mental Status: She is alert and oriented to person, place, and time.   Psychiatric:         Behavior: Behavior normal.         Results Review:  I reviewed the patient's new clinical results.  I reviewed the patient's new imaging results and agree with the interpretation.  I reviewed the patient's other test results and agree with the interpretation  I personally viewed and interpreted the patient's EKG/Telemetry data  Discussed with ED provider.    Lab Results (last 24 hours)       Procedure Component Value Units Date/Time    TROPONIN I, HIGH SENSITIVE [[id number]] Collected: 12/01/24 1402     Updated: 12/01/24 2252    COMPREHENSIVE METABOLIC PANEL [[id number]]  (Abnormal) Collected: 12/01/24 1402     Updated: 12/01/24 2252    CBC (NO DIFF) [[id number]]  (Abnormal) Collected: 12/01/24 1402     Updated: 12/01/24 2252    LIPASE [[id number]] Collected: 12/01/24 1410    Specimen: Blood Updated: 12/01/24 2252     Lipase 76 Units/Liter     LACTIC ACID, PLASMA [[id number]] Collected: 12/01/24 1549     Updated: 12/01/24 2252            Imaging Results (Last 24 Hours)       ** No results found for the last 24 hours. **              No orders to display     Assessment/Plan   Assessment & Plan   Active Hospital Problems    Diagnosis  POA    **Calculus of bile duct without cholecystitis with obstruction [K80.51]  Yes    Obesity (BMI 30-39.9) [E66.9]  Yes    Biliary tract obstruction [K83.1]  Yes      Resolved Hospital Problems   No resolved problems to display.       64 y.o. female admitted with Calculus of bile duct without cholecystitis with obstruction.    Outside ED  consulted biliary GI on-call, Dr. Gant.  Recommendation was for admission to the hospital for MRCP and possible ERCP.  Given leukocytosis and concern for obstructing stone will start Zosyn.  Will give IV fluids.  Repeat CMP in AM.  Follow-up results MRCP.    Will consult general surgery for consideration of cholecystectomy.      SCDs for DVT prophylaxis.  Full code.  Discussed with patient and ED provider.      Dami Milton MD  [facility]  12/02/24  00:50 EST    Patient was seen prior to midnight despite note being completed later due to patient care related issues.

## 2024-12-02 NOTE — ANESTHESIA PREPROCEDURE EVALUATION
Anesthesia Evaluation     no history of anesthetic complications:                Airway   Mallampati: III  TM distance: <3 FB  Neck ROM: full  Small opening  Dental      Comment: Upper front 2 teeth capped    Pulmonary    (+) pneumonia , a smoker Former,  (-) shortness of breath, recent URI  Cardiovascular     (+) hyperlipidemia  (-) dysrhythmias, angina      Neuro/Psych  (-) dizziness/light headedness, syncope  GI/Hepatic/Renal/Endo    (+) obesity, liver disease history of elevated LFT  (-) hepatitis, no renal disease, diabetes    ROS Comment: Billiary obstruction, bile duct stone    Musculoskeletal     Abdominal    Substance History      OB/GYN          Other                    Anesthesia Plan    ASA 3     general     intravenous induction     Anesthetic plan, risks, benefits, and alternatives have been provided, discussed and informed consent has been obtained with: patient.    CODE STATUS:    Code Status (Patient has no pulse and is not breathing): CPR (Attempt to Resuscitate)  Medical Interventions (Patient has pulse or is breathing): Full Support

## 2024-12-02 NOTE — PROGRESS NOTES
Clinical Pharmacy Services: Medication History    Caitlin Olivera is a 64 y.o. female presenting to [facility] for Biliary Obstruction    She  has a past medical history of Arthritis, Elevated cholesterol, and Foot injury.    Allergies as of 12/01/2024    (No Known Allergies)       Medication information was obtained from: Patient medication bottles that were provided by her daughter  Pharmacy and Phone Number: [facility]                # [phone]    Prior to Admission Medications       Prescriptions Last Dose Informant Patient Reported? Taking?    amphetamine-dextroamphetamine (ADDERALL) 20 MG tablet  Medication Bottle, Self Yes Yes    Take 1 tablet by mouth Daily.    atorvastatin (LIPITOR) 40 MG tablet  Self, Medication Bottle Yes Yes    Take 1 tablet by mouth Daily.    azelastine (ASTEPRO) 0.15 % solution nasal spray  Medication Bottle, Self No Yes    1 spray into the nostril(s) as directed by provider 2 (Two) Times a Day.    Patient taking differently:  Administer 1 spray into the nostril(s) as directed by provider 2 (Two) Times a Day As Needed for Allergies.    Brexpiprazole (Rexulti) 2 MG tablet  Medication Bottle, Self Yes Yes    Take 1 tablet by mouth Daily.    DULoxetine (CYMBALTA) 60 MG capsule  Medication Bottle, Self Yes Yes    Take 2 capsules by mouth Daily. Patient takes 2 capsules (120 mg) daily    hydrOXYzine (ATARAX) 50 MG tablet  Medication Bottle, Self Yes Yes    Take 3 tablets by mouth Daily. Bottle states to take 1 tablet (50 mg) by mouth three times daily PRN, patient states she takes 3 tabs (150 mg) PO once daily    lamoTRIgine (LaMICtal) 200 MG tablet  Medication Bottle, Self Yes Yes    Take 1 tablet by mouth Daily.    MULTIPLE VITAMIN PO  Medication Bottle, Self Yes Yes    Take 1 tablet by mouth Daily.    naproxen (EC NAPROSYN) 500 MG EC tablet  Medication Bottle, Self No Yes    Take 1 tablet by mouth 2 (Two) Times a Day As Needed for Mild Pain.    omeprazole (priLOSEC)  20 MG capsule  Medication Bottle, Self Yes Yes    Take 1 capsule by mouth.              Medication notes: I personally interviewed the patient and her daughter and all PTA meds have been updated accordingly.     This medication list is complete to the best of my knowledge as of 12/2/2024    Please call if questions.    Dakotah Sandoval, PharmD  12/2/2024 12:20 EST

## 2024-12-02 NOTE — CONSULTS
General Surgery     Summary:     Caitlin Olivera is a 64 y.o. year old with likely choledocholithiasis.  Ultrasound of gallbladder completed at outside hospital demonstrated biliary sludge without evidence of acute cholecystitis and a distended common bile duct.  , , total bili 6.5, alk phos 230, WBC 13, lipase 76.  Patient with right upper quadrant pain and tenderness without rebound or guarding. AVSS.     Plan  MRCP ordered - demonstrated choledocholithiasis,cholelithiasis and acute cholecystitis  GI consulted for ERCP, discussed with patient plans for laparoscopic cholecystectomy with DR. Calles after ERCP, she agrees with the plan    Chief Complaint:    Right upper quadrant abdominal pain    History of Present Illness:     Caitlin Olivera is a 64 y.o. year old who presented to the ED from [facility] with epigastric to right upper quadrant abdominal pain that started after her Thanksgiving meal on Thursday.  Having some shortness of breath and chest pain, associated nausea and vomiting, fever and chills.  Denies diarrhea.  Denies anything like this before.    Past Medical History:     OA  Bipolar  Acid reflux    Past Surgical History:    Denies any prior abdominal surgeries  Last colonoscopy 3/2022 with Dr. Montero  -diverticulosis    Family History:      Denies family history of gallbladder disease, mother with colon cancer    Social History:      Denies tobacco use, former smoker  Occasional alcohol use    Allergies:   No Known Allergies    Medications:   Adderall  Lipitor  Rexulti  Cymbalta  Hydroxyzine  Lamictal  Naproxen  Prilosec    Radiology/Endoscopy:    Ultrasound of gallbladder from [facility]: Echogenic sludge/polyps, no pericholecystic fluid, CBD dilated     MRCP 12/2/24  IMPRESSION:  1. Cholelithiasis with findings suggesting cholecystitis.  2. Moderate intra and extrahepatic bili duct dilation. Common bile duct  is dilated to the level of the distalmost CBD/ampulla  with multiple  suspected subcentimeter stones in the distal CBD (choledocholithiasis).  3. Small focus of diffusion restriction at the left kidney superior pole  may correspond with a small hemorrhagic/proteinaceous cyst. Recommend  follow-up abdominal MRI in 6-12 months to demonstrate stability.  4. 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 Consider follow-up abdominal MRI/MRCP in 1  year per American College of radiology (ACR) guidelines.  5. Hepatic steatosis with calculated fat fraction of 17%.        This report was finalized on 12/2/2024 2:44 PM by Dr. Arnulfo Torres M.D on Workstation: [id number]      Labs:    Results from last 7 days   Lab Units 12/02/24  0649 12/02/24  0033   WBC 10*3/mm3 13.18* 12.38*   HEMOGLOBIN g/dL 12.4 12.5   HEMATOCRIT % 38.8 38.7   PLATELETS 10*3/mm3 272 272     Results from last 7 days   Lab Units 12/02/24  0649 12/02/24  0033   SODIUM mmol/L 137 137   POTASSIUM mmol/L 3.7 4.0   CHLORIDE mmol/L 100 101   CO2 mmol/L 23.5 25.0   BUN mg/dL 14 15   CREATININE mg/dL 1.01* 0.99   CALCIUM mg/dL 9.6 9.4   BILIRUBIN mg/dL 6.5* 6.1*   ALK PHOS U/L 230* 237*   ALT (SGPT) U/L 397* 440*   AST (SGOT) U/L 195* 223*   GLUCOSE mg/dL 79 89   Lipase 76    BMI 34.7  Weight 80.6 kg    Vitals  Temp 98.7, HR 91, RR 18, /88, SpO2 97    Review of Systems   Constitutional:  Positive for chills and fever.   HENT:  Negative for trouble swallowing.    Respiratory:  Positive for shortness of breath. Negative for chest tightness.    Cardiovascular:  Negative for chest pain and palpitations.   Gastrointestinal:  Positive for abdominal pain, nausea and vomiting. Negative for diarrhea.   Musculoskeletal:  Negative for back pain and myalgias.   Skin:  Negative for rash and wound.   Neurological:  Negative for dizziness and confusion.   Psychiatric/Behavioral:  The patient is nervous/anxious.         Physical Exam  Constitutional:       General: She is  not in acute distress.     Appearance: She is not ill-appearing.   HENT:      Head: Normocephalic.   Eyes:      General: Scleral icterus present.      Extraocular Movements: Extraocular movements intact.      Pupils: Pupils are equal, round, and reactive to light.   Pulmonary:      Effort: Pulmonary effort is normal.   Abdominal:      General: There is no distension.      Palpations: Abdomen is soft.      Tenderness: There is abdominal tenderness. There is no guarding or rebound.   Skin:     General: Skin is warm and dry.   Neurological:      General: No focal deficit present.      Mental Status: She is alert and oriented to person, place, and time.   Psychiatric:         Mood and Affect: Mood normal.         Behavior: Behavior normal.                  YURIY Sanchez   General and Endoscopic Surgery  [facility]    [address], [address]  [place], [place], [address]  P: [phone]  F: [phone]

## 2024-12-02 NOTE — OP NOTE
ENDOSCOPIC RETROGRADE CHOLANGIOPANCREATOGRAPHY Procedure Report    Patient Name:  Caitlin Olivera  YOB: 1960    Date of Surgery:  12/2/2024     Pre-Op Diagnosis:  Choledocholithiasis       Post-Op Diagnosis Codes:  Choledocholithiasis      Procedure/CPT® Codes:      Procedure(s):  ENDOSCOPIC RETROGRADE CHOLANGIOPANCREATOGRAPHY sphincterotomy, stone removal    Staff:  Surgeon(s):  Dami Sánchez MD      Anesthesia: Monitored Anesthesia Care    Description of Procedure:  A description of the procedure as well as risks, benefits and alternative methods were explained to the patient who voiced understanding and signed the corresponding consent form.Specifically risks of post-ERCP pancreatitis, bleeding, perforation, failure to canulate and adverse reaction to sedation were discussed. A physical exam was performed and vital signs were monitored throughout the procedure.    A  film was performed which was normal. With the patient in the semi-prone position, an Olympus side viewing endoscope was placed into the mouth and proceeded through the esophagus, stomach and second portion of the duodenum without difficulty. Limited views of the esophagus and stomach were normal. The ampulla was visualized and appeared normal. A [employer] hydrotome was used to cannulate the ampulla using wire guided technique. Bile was aspirated to ensure this was the duct of interest. Contrast was injected into the bile duct.  Filling defect is noted in the distal bile duct.  A sphincterotomy was performed in the 11 o'clock position using the sphincterotome and ERBE in the usual fashion.  Approximately a 4 mm to 5 mm sphincterotomy was performed.  Next, a 12 mm to 15 mm stone extraction balloon was used to sweep the duct extracting 3 stones to were pigmented and 1 was whitish-yellow color.  The larger whitish-yellow colored stone measured 5 mm.  Occlusion cholangiogram was then performed showing no further  filling defects and excellent drainage of bile so I elected not to place a stent.  The tools were removed from the scope as it was withdrawn back to the stomach. The scope was then retroflexed and the fundus was visualized. The procedure was not difficult and there were no immediate complications.  There was no blood loss.    Impression:  1.  Limited view of the esophagus was normal  2.  Limited view of the stomach is normal  3.  Limited view of the duodenum was normal  4.  Normal-appearing ampulla with some edema and blood specks likely from impacted stone.  Selective cannulation of the common bile duct was achieved with the sphincterotome and wire followed by cholangiogram showing filling defects of the distal bile duct followed by sphincterotomy and stone extraction.  Occlusion cholangiogram negative for further filling defects.    Recommendations:  Clear liquid diet tonight  Low-fat diet tomorrow and less planning on cholecystectomy.  Avoid NSAIDs and blood thinners if possible for 7 days  1 L of lactated Ringer's given as well as indomethacin.      Dami Sánchez MD     Date: 12/2/2024    Time: 18:19 EST

## 2024-12-02 NOTE — PROGRESS NOTES
Discharge Planning Assessment  [facility]     Patient Name: Caitlin Olivera  MRN: [id number]  Today's Date: 12/2/2024    Admit Date: 12/1/2024    Plan: Home no needs   Discharge Needs Assessment       Row Name 12/02/24 1158       Living Environment    Current Living Arrangements home    Primary Care Provided by self    Provides Primary Care For no one    Family Caregiver if Needed child(paulina), adult    Quality of Family Relationships involved;supportive;helpful    Able to Return to Prior Arrangements yes       Resource/Environmental Concerns    Resource/Environmental Concerns none       Transition Planning    Patient/Family Anticipates Transition to home with family    Patient/Family Anticipated Services at Transition none    Transportation Anticipated family or friend will provide       Discharge Needs Assessment    Equipment Currently Used at Home cpap    Concerns to be Addressed no discharge needs identified    Equipment Needed After Discharge none    Provided Post Acute Provider List? N/A    N/A Provider List Comment Denies needs. Plan is home                   Discharge Plan       Row Name 12/02/24 1159       Plan    Plan Home no needs    Plan Comments Introduced self and role of CCP. Patient confirmed DC plan is to return to home. Stated she is independent with ADL's and uses no DMEs. Stated she will have assistance as needed Denies any needs/equipment.                  Continued Care and Services - Admitted Since 12/1/2024    No active coordination exists for this encounter.          Demographic Summary       Row Name 12/02/24 1156       General Information    Admission Type observation    Arrived From home    Required Notices Provided Observation Status Notice    Reason for Consult discharge planning    Preferred Language English                   Functional Status       Row Name 12/02/24 1157       Functional Status    Usual Activity Tolerance good    Current Activity Tolerance good       Functional  Status, IADL    Medications independent    Meal Preparation independent    Housekeeping independent    Laundry independent    Shopping independent       Mental Status    General Appearance WDL WDL       Employment/    Employment Status disabled                   Psychosocial       Row Name 12/02/24 1157       Values/Beliefs    Spiritual, Cultural Beliefs, Faith Practices, Values that Affect Care no       Behavior WDL    Behavior WDL WDL       Emotion Mood WDL    Emotion/Mood/Affect WDL WDL       Speech WDL    Speech WDL WDL       Perceptual State WDL    Perceptual State WDL WDL       Coping/Stress    Sources of Support adult child(paulina)    Reaction to Health Status adjusting    Understanding of Condition and Treatment adequate understanding of treatment       Developmental Stage (Eriksson's Stages of Development)    Developmental Stage Stage 7 (35-65 years/Middle Adulthood) Generativity vs. Stagnation                   Abuse/Neglect       Row Name 12/02/24 1157       Personal Safety    Feels Unsafe at Home or Work/School no    Feels Threatened by Someone no    Does Anyone Try to Keep You From Having Contact with Others or Doing Things Outside Your Home? no    Physical Signs of Abuse Present no               Venus cShrader, RN

## 2024-12-02 NOTE — CONSULTS
GI CONSULT  NOTE:    Referring Provider:  Dr. Cortez    Chief complaint: Elevated bilirubin, elevated liver enzymes, right upper quadrant pain    Subjective .     History of present illness: 18-year-old female presented to outside hospital with abdominal pain in the right upper quadrant.  Started after Thanksgiving.  Did have some shortness of breath and chest pain levels as well as nausea and vomiting.  Denies any trouble swallowing but daughter is at the bedside who states the patient does cough sometimes when drinking liquids.      Endo History:  Multiple colonoscopies in the past but never has had an EGD    Past Medical History:  Past Medical History:   Diagnosis Date    Arthritis     Elevated cholesterol     Foot injury     Right 2000 Left        Past Surgical History:  Past Surgical History:   Procedure Laterality Date    COLONOSCOPY      FOOT SURGERY Left 2014    KNEE SURGERY      WISDOM TOOTH EXTRACTION         Social History:  Social History     Tobacco Use    Smoking status: Former     Current packs/day: 0.00     Types: Cigarettes     Quit date:      Years since quittin.9    Smokeless tobacco: Never   Vaping Use    Vaping status: Never Used   Substance Use Topics    Alcohol use: Yes     Comment: occasionally    Drug use: No       Family History:  Family History   Problem Relation Age of Onset    Cancer Mother     Colon cancer Mother     Alcohol abuse Father     Liver disease Father     No Known Problems Sister     No Known Problems Brother     Heart disease Maternal Grandfather     Breast cancer Neg Hx     Ovarian cancer Neg Hx     Uterine cancer Neg Hx     Deep vein thrombosis Neg Hx     Pulmonary embolism Neg Hx     Osteoporosis Neg Hx        Medications:  Medications Prior to Admission   Medication Sig Dispense Refill Last Dose/Taking    amphetamine-dextroamphetamine (ADDERALL) 20 MG tablet Take 1 tablet by mouth Daily.   Taking    atorvastatin (LIPITOR) 40 MG tablet Take 1 tablet by  mouth Daily.   Taking    azelastine (ASTEPRO) 0.15 % solution nasal spray 1 spray into the nostril(s) as directed by provider 2 (Two) Times a Day. (Patient taking differently: Administer 1 spray into the nostril(s) as directed by provider 2 (Two) Times a Day As Needed for Allergies.) 30 mL 0 Taking Differently    Brexpiprazole (Rexulti) 2 MG tablet Take 1 tablet by mouth Daily.   Taking    DULoxetine (CYMBALTA) 60 MG capsule Take 2 capsules by mouth Daily. Patient takes 2 capsules (120 mg) daily   Taking    hydrOXYzine (ATARAX) 50 MG tablet Take 3 tablets by mouth Daily. Bottle states to take 1 tablet (50 mg) by mouth three times daily PRN, patient states she takes 3 tabs (150 mg) PO once daily   Taking    lamoTRIgine (LaMICtal) 200 MG tablet Take 1 tablet by mouth Daily.   Taking    MULTIPLE VITAMIN PO Take 1 tablet by mouth Daily.   Taking    naproxen (EC NAPROSYN) 500 MG EC tablet Take 1 tablet by mouth 2 (Two) Times a Day As Needed for Mild Pain. 20 tablet 0 Taking As Needed    omeprazole (priLOSEC) 20 MG capsule Take 1 capsule by mouth.   Taking       Scheduled Meds:DULoxetine, 120 mg, Oral, Daily  famotidine, 20 mg, Intravenous, Q12H  lamoTRIgine, 200 mg, Oral, Daily  piperacillin-tazobactam, 3.375 g, Intravenous, Q8H      Continuous Infusions:lactated ringers, 1,000 mL, Last Rate: 1,000 mL (12/02/24 1628)  sodium chloride, 100 mL/hr, Last Rate: 100 mL/hr (12/02/24 1343)      PRN Meds:.  acetaminophen    aluminum-magnesium hydroxide-simethicone    senna-docusate sodium **AND** polyethylene glycol **AND** bisacodyl **AND** bisacodyl    hydrOXYzine    Morphine    ondansetron ODT **OR** ondansetron    ALLERGIES:  Patient has no known allergies.    ROS:  Review of Systems   Cardiovascular:  Positive for chest pain.   Gastrointestinal:  Positive for abdominal pain, nausea and vomiting.   All other systems reviewed and are negative.      Objective     Vital Signs:   Vitals:    12/02/24 0604 12/02/24 0930 12/02/24  1320 12/02/24 1622   BP: 140/77 151/88 133/84 143/84   BP Location: Left arm Left arm Left arm Left arm   Patient Position: Lying Lying Lying Sitting   Pulse: 73 91  82   Resp: 18 18 18 16   Temp: 98.5 °F (36.9 °C) 98.7 °F (37.1 °C)     TempSrc: Oral Oral     SpO2: 98% 97% 97% 94%   Weight:       Height:             Physical Exam:      General Appearance:    Awake and alert, appears uncomfortable   Head:    Normocephalic, without obvious abnormality, atraumatic   Eyes:            Conjunctivae normal, anicteric sclerae   Ears:    Ears appear intact with no abnormalities noted   Throat:   No oral lesions, no thrush, oral mucosa moist   Neck:   No adenopathy, supple, no thyromegaly, no JVD   Lungs:     Respirations regular, even and unlabored   Chest Wall:    No abnormalities observed   Abdomen:     Soft, tender to palpation right upper quadrant midepigastric with no rebound or guarding.  Obese   Rectal:     Deferred   Extremities:   Moves all extremities well, no edema, no cyanosis, no         redness   Pulses:   Pulses palpable and equal bilaterally   Skin:   No bleeding, bruising or rash, no jaundice   Lymph nodes:   No palpable adenopathy   Neurologic:   Cranial nerves 2 - 12 grossly intact, no asterixis, sensation intact       Results Review:   I reviewed the patient's labs and imaging.  Lab Results (last 24 hours)       Procedure Component Value Units Date/Time    Comprehensive Metabolic Panel [[id number]]  (Abnormal) Collected: 12/02/24 0649    Specimen: Blood Updated: 12/02/24 0802     Glucose 79 mg/dL      BUN 14 mg/dL      Creatinine 1.01 mg/dL      Sodium 137 mmol/L      Potassium 3.7 mmol/L      Chloride 100 mmol/L      CO2 23.5 mmol/L      Calcium 9.6 mg/dL      Total Protein 6.3 g/dL      Albumin 3.7 g/dL      ALT (SGPT) 397 U/L      AST (SGOT) 195 U/L      Alkaline Phosphatase 230 U/L      Total Bilirubin 6.5 mg/dL      Globulin 2.6 gm/dL      A/G Ratio 1.4 g/dL      BUN/Creatinine Ratio 13.9     Anion  Gap 13.5 mmol/L      eGFR 63.1 mL/min/1.73     Narrative:      GFR Normal >60  Chronic Kidney Disease <60  Kidney Failure <15      Protime-INR [[id number]]  (Normal) Collected: 12/02/24 0649    Specimen: Blood Updated: 12/02/24 0750     Protime 14.2 Seconds      INR 1.08    CBC & Differential [[id number]]  (Abnormal) Collected: 12/02/24 0649    Specimen: Blood Updated: 12/02/24 0736    Narrative:      The following orders were created for panel order CBC & Differential.  Procedure                               Abnormality         Status                     ---------                               -----------         ------                     CBC Auto Differential[[id number]]        Abnormal            Final result                 Please view results for these tests on the individual orders.    CBC Auto Differential [[id number]]  (Abnormal) Collected: 12/02/24 0649    Specimen: Blood Updated: 12/02/24 0736     WBC 13.18 10*3/mm3      RBC 4.56 10*6/mm3      Hemoglobin 12.4 g/dL      Hematocrit 38.8 %      MCV 85.1 fL      MCH 27.2 pg      MCHC 32.0 g/dL      RDW 13.2 %      RDW-SD 41.2 fl      MPV 9.1 fL      Platelets 272 10*3/mm3      Neutrophil % 76.2 %      Lymphocyte % 10.1 %      Monocyte % 10.1 %      Eosinophil % 2.8 %      Basophil % 0.2 %      Immature Grans % 0.6 %      Neutrophils, Absolute 10.04 10*3/mm3      Lymphocytes, Absolute 1.33 10*3/mm3      Monocytes, Absolute 1.33 10*3/mm3      Eosinophils, Absolute 0.37 10*3/mm3      Basophils, Absolute 0.03 10*3/mm3      Immature Grans, Absolute 0.08 10*3/mm3      nRBC 0.0 /100 WBC     Comprehensive Metabolic Panel [[id number]]  (Abnormal) Collected: 12/02/24 0033    Specimen: Blood Updated: 12/02/24 0119     Glucose 89 mg/dL      BUN 15 mg/dL      Creatinine 0.99 mg/dL      Sodium 137 mmol/L      Potassium 4.0 mmol/L      Chloride 101 mmol/L      CO2 25.0 mmol/L      Calcium 9.4 mg/dL      Total Protein 6.2 g/dL      Albumin 3.7 g/dL      ALT (SGPT) 440 U/L       AST (SGOT) 223 U/L      Alkaline Phosphatase 237 U/L      Total Bilirubin 6.1 mg/dL      Globulin 2.5 gm/dL      A/G Ratio 1.5 g/dL      BUN/Creatinine Ratio 15.2     Anion Gap 11.0 mmol/L      eGFR 64.6 mL/min/1.73     Narrative:      GFR Normal >60  Chronic Kidney Disease <60  Kidney Failure <15      Protime-INR [[id number]]  (Abnormal) Collected: 12/02/24 0033    Specimen: Blood Updated: 12/02/24 0112     Protime 14.4 Seconds      INR 1.10    CBC & Differential [[id number]]  (Abnormal) Collected: 12/02/24 0033    Specimen: Blood Updated: 12/02/24 0057    Narrative:      The following orders were created for panel order CBC & Differential.  Procedure                               Abnormality         Status                     ---------                               -----------         ------                     CBC Auto Differential[[id number]]        Abnormal            Final result                 Please view results for these tests on the individual orders.    CBC Auto Differential [[id number]]  (Abnormal) Collected: 12/02/24 0033    Specimen: Blood Updated: 12/02/24 0057     WBC 12.38 10*3/mm3      RBC 4.49 10*6/mm3      Hemoglobin 12.5 g/dL      Hematocrit 38.7 %      MCV 86.2 fL      MCH 27.8 pg      MCHC 32.3 g/dL      RDW 13.3 %      RDW-SD 41.5 fl      MPV 8.7 fL      Platelets 272 10*3/mm3      Neutrophil % 75.2 %      Lymphocyte % 10.4 %      Monocyte % 10.8 %      Eosinophil % 2.8 %      Basophil % 0.2 %      Immature Grans % 0.6 %      Neutrophils, Absolute 9.30 10*3/mm3      Lymphocytes, Absolute 1.29 10*3/mm3      Monocytes, Absolute 1.34 10*3/mm3      Eosinophils, Absolute 0.35 10*3/mm3      Basophils, Absolute 0.02 10*3/mm3      Immature Grans, Absolute 0.08 10*3/mm3      nRBC 0.0 /100 WBC             Imaging Results (Last 24 Hours)       Procedure Component Value Units Date/Time    MRI abdomen w wo contrast mrcp [[id number]] Collected: 12/02/24 1406     Updated: 12/02/24 1447    Narrative:      MRI  ABDOMEN WITH AND WITHOUT CONTRAST     HISTORY: Biliary obstruction/elevated serum bilirubin levels.     TECHNIQUE: Multiplanar multisequence MRI of the abdomen was performed  before and after the administration of IV Multihance.      COMPARISON: Outside right upper quadrant ultrasound 12/1/2024     FINDINGS: Gallbladder is mildly distended with numerous small  gallstones. Circumferential gallbladder wall thickening measuring up to  4-5 mm with associated diffusion restriction (series 7/image 24 and  series 22/image 51). Pericholecystic edema/inflammatory changes and  small volume pericholecystic free fluid layering along the right hepatic  lobe into the right paracolic gutter (series 6/images 26 and 30).  Increased T2 hyperintense signal/edema surrounding the common duct.  Moderate intra and extrahepatic bili duct dilation. Common bile duct is  dilated up to 1.5 cm (series 9/image 9). Common bile duct is dilated to  the level of the distalmost CBD/ampulla. Multiple suspected  subcentimeter dependent round T2 hypointense filling defects within the  distal most common bile duct (series 7/image 24 and series 9/image 11).  No abnormal enhancement or soft tissue nodularity at the level of the  ampulla. Nondilated main pancreatic duct.     Noncirrhotic liver morphology. Hepatic steatosis with calculated fat  fraction of 17%. Conventional hepatic arterial anatomy. Patent hepatic  and portal veins. No focal hepatic lesion.     Preserved pancreas parenchymal volume and intrinsic T1 hyperintense  signal. Nondilated main pancreatic duct. Few subcentimeter pancreatic  cystic lesions. For reference in the pancreatic tail (series 6/image 13)  and within the pancreatic head (series 6/image 24). No associated  enhancement or soft tissue nodularity.     Small focus of diffusion restriction/ADC hypointense signal at the left  kidney superior pole (series 22/image 43). Corresponding focus of  intrinsic T1 hyperintense signal (series  12/image 23). No associated  enhancement. Contralateral subcentimeter T1 hyperintense  hemorrhagic/proteinaceous cyst (series 12/image 42).     Normal spleen, adrenal glands and visualized bowel. Few periportal lymph  nodes at the upper limits of normal for size. Reference 1 cm portal  caval lymph node (series 16/image 27). These are most likely  reactive/inflammatory. No enhancing osseous lesion.             Impression:      1. Cholelithiasis with findings suggesting cholecystitis.  2. Moderate intra and extrahepatic bili duct dilation. Common bile duct  is dilated to the level of the distalmost CBD/ampulla with multiple  suspected subcentimeter stones in the distal CBD (choledocholithiasis).  3. Small focus of diffusion restriction at the left kidney superior pole  may correspond with a small hemorrhagic/proteinaceous cyst. Recommend  follow-up abdominal MRI in 6-12 months to demonstrate stability.  4. 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 Consider follow-up abdominal MRI/MRCP in 1  year per [employer] College of radiology (ACR) guidelines.  5. Hepatic steatosis with calculated fat fraction of 17%.        This report was finalized on 12/2/2024 2:44 PM by Dr. Arnulfo Torrse M.D on Workstation: [id number]       US [employer] [[id number]] Resulted: 12/02/24 1022     Updated: 12/02/24 1022    Narrative:      This procedure was auto-finalized with no dictation required.               ASSESSMENT:  Choledocholithiasis  Elevated liver enzymes  Elevated bilirubin  Right upper quadrant abdominal pain  Oropharyngeal dysphagia      Principal Problem:    Calculus of bile duct without cholecystitis with obstruction  Active Problems:    Obesity (BMI 30-39.9)    Biliary tract obstruction    Cholecystitis     Impression-this is a patient presented with abdominal pain with MRCP confirming choledocholithiasis.  Her pain persists.  She does have what sounds to be  oropharyngeal dysphagia to liquids only.  We will plan on ERCP today.    PLAN:  -N.p.o.  - ERCP today-the risks including a 7 to 10% risk of post ERCP pancreatitis, bleeding, infection, perforation, anesthesia risk, and even death were all discussed with the patient who agreed to proceed with the procedure.  Alternatives to ERCP were discussed.  - May benefit from modified barium swallow if she continues to cough with drinking liquids.  This does not sound like stricturing disease but more like oropharyngeal dysphagia.    I discussed the patient's findings and my recommendations with the patient.  Dami Sánchez MD  12/02/24  17:11 EST

## 2024-12-02 NOTE — ANESTHESIA PROCEDURE NOTES
Airway  Urgency: elective    Date/Time: 12/2/2024 5:32 PM    General Information and Staff    Patient location during procedure: OR  Anesthesiologist: Susi Coyle MD    Indications and Patient Condition    Preoxygenated: yes  Mask difficulty assessment: 1 - vent by mask    Final Airway Details  Final airway type: endotracheal airway      Successful airway: ETT  Cuffed: yes   Successful intubation technique: direct laryngoscopy and video laryngoscopy  Blade: Karen  Blade size: 3  ETT size (mm): 7.0  Cormack-Lehane Classification: grade I - full view of glottis  Placement verified by: capnometry   Measured from: teeth  Number of attempts at approach: 1  Assessment: atraumatic intubation

## 2024-12-02 NOTE — PLAN OF CARE
Goal Outcome Evaluation:  Plan of Care Reviewed With: patient        Progress: no change  Outcome Evaluation: direct admit this shift with c/o abd pain, med for this pain with relief, surgery and gi consults, npo x ice chips, offered and refused pain med on last rounds

## 2024-12-02 NOTE — PROGRESS NOTES
Reviewed outpatient pharmacy records. According to fills going back to 01/04/24 pt has been receiving Lamictal 200mg IR tablets QD, despite this hospital stay's order being reordered from an ER formulation. Use of Lamictal 200mg IR is appropriate for this inpatient stay.     Thank you,   Omid Butler, PharmD   Clinical Pharmacist

## 2024-12-03 PROBLEM — K80.65 CALCULUS OF GALLBLADDER AND BILE DUCT WITH CHRONIC CHOLECYSTITIS WITH OBSTRUCTION: Status: ACTIVE | Noted: 2024-12-01

## 2024-12-03 PROBLEM — K80.45 CALCULUS OF BILE DUCT WITH CHRONIC CHOLECYSTITIS WITH OBSTRUCTION: Status: ACTIVE | Noted: 2024-12-01

## 2024-12-03 LAB
ALBUMIN SERPL-MCNC: 3.1 G/DL (ref 3.5–5.2)
ALBUMIN/GLOB SERPL: 0.9 G/DL
ALP SERPL-CCNC: 216 U/L (ref 39–117)
ALT SERPL W P-5'-P-CCNC: 295 U/L (ref 1–33)
ANION GAP SERPL CALCULATED.3IONS-SCNC: 9.5 MMOL/L (ref 5–15)
AST SERPL-CCNC: 120 U/L (ref 1–32)
BILIRUB SERPL-MCNC: 2.8 MG/DL (ref 0–1.2)
BUN SERPL-MCNC: 13 MG/DL (ref 8–23)
BUN/CREAT SERPL: 16 (ref 7–25)
CALCIUM SPEC-SCNC: 9 MG/DL (ref 8.6–10.5)
CHLORIDE SERPL-SCNC: 106 MMOL/L (ref 98–107)
CO2 SERPL-SCNC: 23.5 MMOL/L (ref 22–29)
CREAT SERPL-MCNC: 0.81 MG/DL (ref 0.57–1)
DEPRECATED RDW RBC AUTO: 40.1 FL (ref 37–54)
EGFRCR SERPLBLD CKD-EPI 2021: 82.2 ML/MIN/1.73
ERYTHROCYTE [DISTWIDTH] IN BLOOD BY AUTOMATED COUNT: 13.1 % (ref 12.3–15.4)
GLOBULIN UR ELPH-MCNC: 3.3 GM/DL
GLUCOSE SERPL-MCNC: 136 MG/DL (ref 65–99)
HCT VFR BLD AUTO: 35.7 % (ref 34–46.6)
HGB BLD-MCNC: 11.5 G/DL (ref 12–15.9)
MCH RBC QN AUTO: 27.3 PG (ref 26.6–33)
MCHC RBC AUTO-ENTMCNC: 32.2 G/DL (ref 31.5–35.7)
MCV RBC AUTO: 84.6 FL (ref 79–97)
PLATELET # BLD AUTO: 270 10*3/MM3 (ref 140–450)
PMV BLD AUTO: 9.2 FL (ref 6–12)
POTASSIUM SERPL-SCNC: 4.1 MMOL/L (ref 3.5–5.2)
PROT SERPL-MCNC: 6.4 G/DL (ref 6–8.5)
RBC # BLD AUTO: 4.22 10*6/MM3 (ref 3.77–5.28)
SODIUM SERPL-SCNC: 139 MMOL/L (ref 136–145)
WBC NRBC COR # BLD AUTO: 9.45 10*3/MM3 (ref 3.4–10.8)

## 2024-12-03 PROCEDURE — 80053 COMPREHEN METABOLIC PANEL: CPT | Performed by: INTERNAL MEDICINE

## 2024-12-03 PROCEDURE — 25010000002 PIPERACILLIN SOD-TAZOBACTAM PER 1 G: Performed by: INTERNAL MEDICINE

## 2024-12-03 PROCEDURE — 85027 COMPLETE CBC AUTOMATED: CPT | Performed by: INTERNAL MEDICINE

## 2024-12-03 PROCEDURE — 99232 SBSQ HOSP IP/OBS MODERATE 35: CPT | Performed by: SURGERY

## 2024-12-03 PROCEDURE — 25010000002 MORPHINE PER 10 MG: Performed by: INTERNAL MEDICINE

## 2024-12-03 RX ADMIN — DULOXETINE HYDROCHLORIDE 120 MG: 60 CAPSULE, DELAYED RELEASE ORAL at 09:27

## 2024-12-03 RX ADMIN — FAMOTIDINE 20 MG: 10 INJECTION INTRAVENOUS at 20:13

## 2024-12-03 RX ADMIN — MORPHINE SULFATE 2 MG: 2 INJECTION, SOLUTION INTRAMUSCULAR; INTRAVENOUS at 19:19

## 2024-12-03 RX ADMIN — PIPERACILLIN AND TAZOBACTAM 3.38 G: 3; .375 INJECTION, POWDER, FOR SOLUTION INTRAVENOUS at 14:26

## 2024-12-03 RX ADMIN — LAMOTRIGINE 200 MG: 100 TABLET ORAL at 09:27

## 2024-12-03 RX ADMIN — HYDROXYZINE HYDROCHLORIDE 50 MG: 25 TABLET ORAL at 23:45

## 2024-12-03 RX ADMIN — FAMOTIDINE 20 MG: 10 INJECTION INTRAVENOUS at 09:27

## 2024-12-03 RX ADMIN — PIPERACILLIN AND TAZOBACTAM 3.38 G: 3; .375 INJECTION, POWDER, FOR SOLUTION INTRAVENOUS at 21:57

## 2024-12-03 RX ADMIN — PIPERACILLIN AND TAZOBACTAM 3.38 G: 3; .375 INJECTION, POWDER, FOR SOLUTION INTRAVENOUS at 06:33

## 2024-12-03 RX ADMIN — MORPHINE SULFATE 2 MG: 2 INJECTION, SOLUTION INTRAMUSCULAR; INTRAVENOUS at 23:45

## 2024-12-03 NOTE — PLAN OF CARE
Goal Outcome Evaluation:  Plan of Care Reviewed With: patient        Progress: no change  Outcome Evaluation: ivf-iv restarted, no c/o pain, vdg, npo with ice, had egd, walked-gait sl unsteady, no orders for surgery as of yet

## 2024-12-03 NOTE — PROGRESS NOTES
"General Surgery  Progress Note    CC: Follow-up choledocholithiasis    POD#1 ERCP with common bile duct stone evacuation    S: She reports feeling much better this morning after undergoing ERCP last night with multiple common bile duct stones removed.  Her bilirubin is down to 2.8 and her AST and ALT have also improved.  She remains afebrile.    ROS: Positive for abdominal pain; denies nausea or vomiting    O:/85 (BP Location: Right arm, Patient Position: Lying)   Pulse 72   Temp 96.9 °F (36.1 °C) (Oral)   Resp 16   Ht 152.4 cm (60\")   Wt 80.6 kg (177 lb 11.1 oz)   SpO2 95%   BMI 34.70 kg/m²     GENERAL: alert, well appearing, and in no distress  HEENT: normocephalic, atraumatic, moist mucous membranes, clear sclerae   CHEST: clear to auscultation, no wheezes, rales or rhonchi, symmetric air entry  CARDIAC: regular rate and rhythm    ABDOMEN: Soft, nondistended, very mild generalized tenderness most prominent in the right upper quadrant with no evidence of guarding or rebound tenderness  EXTREMITIES: no cyanosis, clubbing, or edema   SKIN: Warm and moist, no rashes    LABS  Results from last 7 days   Lab Units 12/03/24  0551 12/02/24  0649 12/02/24  0033   WBC 10*3/mm3 9.45 13.18* 12.38*   HEMOGLOBIN g/dL 11.5* 12.4 12.5   HEMATOCRIT % 35.7 38.8 38.7   PLATELETS 10*3/mm3 270 272 272     Results from last 7 days   Lab Units 12/03/24  0551 12/02/24  0649 12/02/24  0033   SODIUM mmol/L 139 137 137   POTASSIUM mmol/L 4.1 3.7 4.0   CHLORIDE mmol/L 106 100 101   CO2 mmol/L 23.5 23.5 25.0   BUN mg/dL 13 14 15   CREATININE mg/dL 0.81 1.01* 0.99   CALCIUM mg/dL 9.0 9.6 9.4   BILIRUBIN mg/dL 2.8* 6.5* 6.1*   ALK PHOS U/L 216* 230* 237*   ALT (SGPT) U/L 295* 397* 440*   AST (SGOT) U/L 120* 195* 223*   GLUCOSE mg/dL 136* 79 89     Results from last 7 days   Lab Units 12/02/24  0649 12/02/24  0033   INR  1.08 1.10         A/P: 64 y.o. female POD#1 ERCP with common bile duct stone evacuation    Her bilirubin and LFTs " are improving.  I would recommend advancing her to a low-fat diet today and I will plan for laparoscopic cholecystectomy tomorrow morning.  She can likely then be discharged to home tomorrow afternoon.  The risks, benefits, and alternatives to cholecystectomy were discussed with the patient and she is in agreement with this plan.  I also discussed the plan with her nurse, Mary.  The patient should remain n.p.o. at midnight tonight.    Michelle Calles MD  General, Robotic, and Endoscopic Surgery  [facility]    [address], [address]  [place] [address]  P: [phone]  F: [phone]

## 2024-12-04 ENCOUNTER — ANESTHESIA EVENT (OUTPATIENT)
Dept: PERIOP | Facility: HOSPITAL | Age: 64
End: 2024-12-04
Payer: MEDICARE

## 2024-12-04 ENCOUNTER — APPOINTMENT (OUTPATIENT)
Dept: GENERAL RADIOLOGY | Facility: HOSPITAL | Age: 64
End: 2024-12-04
Payer: MEDICARE

## 2024-12-04 ENCOUNTER — ANESTHESIA (OUTPATIENT)
Dept: PERIOP | Facility: HOSPITAL | Age: 64
End: 2024-12-04
Payer: MEDICARE

## 2024-12-04 LAB
ALBUMIN SERPL-MCNC: 3.1 G/DL (ref 3.5–5.2)
ALBUMIN/GLOB SERPL: 1.1 G/DL
ALP SERPL-CCNC: 194 U/L (ref 39–117)
ALT SERPL W P-5'-P-CCNC: 215 U/L (ref 1–33)
ANION GAP SERPL CALCULATED.3IONS-SCNC: 8 MMOL/L (ref 5–15)
AST SERPL-CCNC: 96 U/L (ref 1–32)
BASOPHILS # BLD AUTO: 0.03 10*3/MM3 (ref 0–0.2)
BASOPHILS NFR BLD AUTO: 0.5 % (ref 0–1.5)
BILIRUB SERPL-MCNC: 1.4 MG/DL (ref 0–1.2)
BUN SERPL-MCNC: 16 MG/DL (ref 8–23)
BUN/CREAT SERPL: 20.3 (ref 7–25)
CALCIUM SPEC-SCNC: 8.7 MG/DL (ref 8.6–10.5)
CHLORIDE SERPL-SCNC: 106 MMOL/L (ref 98–107)
CO2 SERPL-SCNC: 25 MMOL/L (ref 22–29)
CREAT SERPL-MCNC: 0.79 MG/DL (ref 0.57–1)
DEPRECATED RDW RBC AUTO: 39.2 FL (ref 37–54)
EGFRCR SERPLBLD CKD-EPI 2021: 84.7 ML/MIN/1.73
EOSINOPHIL # BLD AUTO: 0.21 10*3/MM3 (ref 0–0.4)
EOSINOPHIL NFR BLD AUTO: 3.5 % (ref 0.3–6.2)
ERYTHROCYTE [DISTWIDTH] IN BLOOD BY AUTOMATED COUNT: 12.9 % (ref 12.3–15.4)
GLOBULIN UR ELPH-MCNC: 2.7 GM/DL
GLUCOSE SERPL-MCNC: 96 MG/DL (ref 65–99)
HCT VFR BLD AUTO: 33.8 % (ref 34–46.6)
HGB BLD-MCNC: 11.3 G/DL (ref 12–15.9)
IMM GRANULOCYTES # BLD AUTO: 0.06 10*3/MM3 (ref 0–0.05)
IMM GRANULOCYTES NFR BLD AUTO: 1 % (ref 0–0.5)
LYMPHOCYTES # BLD AUTO: 1.82 10*3/MM3 (ref 0.7–3.1)
LYMPHOCYTES NFR BLD AUTO: 30.6 % (ref 19.6–45.3)
MCH RBC QN AUTO: 28.2 PG (ref 26.6–33)
MCHC RBC AUTO-ENTMCNC: 33.4 G/DL (ref 31.5–35.7)
MCV RBC AUTO: 84.3 FL (ref 79–97)
MONOCYTES # BLD AUTO: 0.43 10*3/MM3 (ref 0.1–0.9)
MONOCYTES NFR BLD AUTO: 7.2 % (ref 5–12)
NEUTROPHILS NFR BLD AUTO: 3.39 10*3/MM3 (ref 1.7–7)
NEUTROPHILS NFR BLD AUTO: 57.2 % (ref 42.7–76)
NRBC BLD AUTO-RTO: 0 /100 WBC (ref 0–0.2)
PLATELET # BLD AUTO: 278 10*3/MM3 (ref 140–450)
PMV BLD AUTO: 9 FL (ref 6–12)
POTASSIUM SERPL-SCNC: 4 MMOL/L (ref 3.5–5.2)
PROT SERPL-MCNC: 5.8 G/DL (ref 6–8.5)
RBC # BLD AUTO: 4.01 10*6/MM3 (ref 3.77–5.28)
SODIUM SERPL-SCNC: 139 MMOL/L (ref 136–145)
WBC NRBC COR # BLD AUTO: 5.94 10*3/MM3 (ref 3.4–10.8)

## 2024-12-04 PROCEDURE — 0FT44ZZ RESECTION OF GALLBLADDER, PERCUTANEOUS ENDOSCOPIC APPROACH: ICD-10-PCS | Performed by: SURGERY

## 2024-12-04 PROCEDURE — 25010000002 PIPERACILLIN SOD-TAZOBACTAM PER 1 G: Performed by: SURGERY

## 2024-12-04 PROCEDURE — 25010000002 DROPERIDOL PER 5 MG: Performed by: ANESTHESIOLOGY

## 2024-12-04 PROCEDURE — 47563 LAPARO CHOLECYSTECTOMY/GRAPH: CPT | Performed by: PHYSICIAN ASSISTANT

## 2024-12-04 PROCEDURE — 25010000002 LIDOCAINE 2% SOLUTION: Performed by: ANESTHESIOLOGY

## 2024-12-04 PROCEDURE — 25010000002 HYDROMORPHONE PER 4 MG: Performed by: ANESTHESIOLOGY

## 2024-12-04 PROCEDURE — 25010000002 PROPOFOL 200 MG/20ML EMULSION: Performed by: ANESTHESIOLOGY

## 2024-12-04 PROCEDURE — 25010000002 FENTANYL CITRATE (PF) 50 MCG/ML SOLUTION: Performed by: NURSE ANESTHETIST, CERTIFIED REGISTERED

## 2024-12-04 PROCEDURE — 25510000002 IOTHALAMATE 60 % SOLUTION: Performed by: SURGERY

## 2024-12-04 PROCEDURE — BF131ZZ FLUOROSCOPY OF GALLBLADDER AND BILE DUCTS USING LOW OSMOLAR CONTRAST: ICD-10-PCS | Performed by: SURGERY

## 2024-12-04 PROCEDURE — 85025 COMPLETE CBC W/AUTO DIFF WBC: CPT | Performed by: SURGERY

## 2024-12-04 PROCEDURE — 74300 X-RAY BILE DUCTS/PANCREAS: CPT

## 2024-12-04 PROCEDURE — 47563 LAPARO CHOLECYSTECTOMY/GRAPH: CPT | Performed by: SURGERY

## 2024-12-04 PROCEDURE — 25010000002 HYDROMORPHONE 1 MG/ML SOLUTION: Performed by: ANESTHESIOLOGY

## 2024-12-04 PROCEDURE — 25010000002 SUGAMMADEX 200 MG/2ML SOLUTION: Performed by: NURSE ANESTHETIST, CERTIFIED REGISTERED

## 2024-12-04 PROCEDURE — 88304 TISSUE EXAM BY PATHOLOGIST: CPT | Performed by: SURGERY

## 2024-12-04 PROCEDURE — 25010000002 PIPERACILLIN SOD-TAZOBACTAM PER 1 G: Performed by: INTERNAL MEDICINE

## 2024-12-04 PROCEDURE — 80053 COMPREHEN METABOLIC PANEL: CPT | Performed by: SURGERY

## 2024-12-04 PROCEDURE — 25010000002 DEXAMETHASONE SODIUM PHOSPHATE 20 MG/5ML SOLUTION: Performed by: ANESTHESIOLOGY

## 2024-12-04 PROCEDURE — 25010000002 ESMOLOL 100 MG/10ML SOLUTION: Performed by: ANESTHESIOLOGY

## 2024-12-04 PROCEDURE — 25810000003 LACTATED RINGERS PER 1000 ML: Performed by: ANESTHESIOLOGY

## 2024-12-04 DEVICE — LIGAMAX 5 MM ENDOSCOPIC MULTIPLE CLIP APPLIER
Type: IMPLANTABLE DEVICE | Site: ABDOMEN | Status: FUNCTIONAL
Brand: LIGAMAX

## 2024-12-04 RX ORDER — DROPERIDOL 2.5 MG/ML
INJECTION, SOLUTION INTRAMUSCULAR; INTRAVENOUS AS NEEDED
Status: DISCONTINUED | OUTPATIENT
Start: 2024-12-04 | End: 2024-12-04 | Stop reason: SURG

## 2024-12-04 RX ORDER — NALOXONE HCL 0.4 MG/ML
0.2 VIAL (ML) INJECTION AS NEEDED
Status: DISCONTINUED | OUTPATIENT
Start: 2024-12-04 | End: 2024-12-04 | Stop reason: HOSPADM

## 2024-12-04 RX ORDER — DEXMEDETOMIDINE HYDROCHLORIDE 100 UG/ML
INJECTION, SOLUTION INTRAVENOUS AS NEEDED
Status: DISCONTINUED | OUTPATIENT
Start: 2024-12-04 | End: 2024-12-04 | Stop reason: SURG

## 2024-12-04 RX ORDER — SODIUM CHLORIDE, SODIUM LACTATE, POTASSIUM CHLORIDE, CALCIUM CHLORIDE 600; 310; 30; 20 MG/100ML; MG/100ML; MG/100ML; MG/100ML
9 INJECTION, SOLUTION INTRAVENOUS CONTINUOUS
Status: DISCONTINUED | OUTPATIENT
Start: 2024-12-04 | End: 2024-12-04

## 2024-12-04 RX ORDER — ESMOLOL HYDROCHLORIDE 10 MG/ML
INJECTION INTRAVENOUS AS NEEDED
Status: DISCONTINUED | OUTPATIENT
Start: 2024-12-04 | End: 2024-12-04 | Stop reason: SURG

## 2024-12-04 RX ORDER — FAMOTIDINE 10 MG/ML
20 INJECTION, SOLUTION INTRAVENOUS ONCE
Status: COMPLETED | OUTPATIENT
Start: 2024-12-04 | End: 2024-12-04

## 2024-12-04 RX ORDER — MAGNESIUM HYDROXIDE 1200 MG/15ML
LIQUID ORAL AS NEEDED
Status: DISCONTINUED | OUTPATIENT
Start: 2024-12-04 | End: 2024-12-04 | Stop reason: HOSPADM

## 2024-12-04 RX ORDER — PROMETHAZINE HYDROCHLORIDE 25 MG/1
25 TABLET ORAL ONCE AS NEEDED
Status: DISCONTINUED | OUTPATIENT
Start: 2024-12-04 | End: 2024-12-04 | Stop reason: HOSPADM

## 2024-12-04 RX ORDER — OXYCODONE AND ACETAMINOPHEN 5; 325 MG/1; MG/1
1 TABLET ORAL EVERY 4 HOURS PRN
Status: DISCONTINUED | OUTPATIENT
Start: 2024-12-04 | End: 2024-12-05 | Stop reason: HOSPADM

## 2024-12-04 RX ORDER — HYDRALAZINE HYDROCHLORIDE 20 MG/ML
5 INJECTION INTRAMUSCULAR; INTRAVENOUS
Status: DISCONTINUED | OUTPATIENT
Start: 2024-12-04 | End: 2024-12-04 | Stop reason: HOSPADM

## 2024-12-04 RX ORDER — ONDANSETRON 2 MG/ML
4 INJECTION INTRAMUSCULAR; INTRAVENOUS ONCE AS NEEDED
Status: DISCONTINUED | OUTPATIENT
Start: 2024-12-04 | End: 2024-12-04 | Stop reason: HOSPADM

## 2024-12-04 RX ORDER — FLUMAZENIL 0.1 MG/ML
0.2 INJECTION INTRAVENOUS AS NEEDED
Status: DISCONTINUED | OUTPATIENT
Start: 2024-12-04 | End: 2024-12-04 | Stop reason: HOSPADM

## 2024-12-04 RX ORDER — HYDROMORPHONE HYDROCHLORIDE 1 MG/ML
0.5 INJECTION, SOLUTION INTRAMUSCULAR; INTRAVENOUS; SUBCUTANEOUS
Status: DISCONTINUED | OUTPATIENT
Start: 2024-12-04 | End: 2024-12-04 | Stop reason: HOSPADM

## 2024-12-04 RX ORDER — DIPHENHYDRAMINE HYDROCHLORIDE 50 MG/ML
12.5 INJECTION INTRAMUSCULAR; INTRAVENOUS
Status: DISCONTINUED | OUTPATIENT
Start: 2024-12-04 | End: 2024-12-04 | Stop reason: HOSPADM

## 2024-12-04 RX ORDER — LIDOCAINE HYDROCHLORIDE 10 MG/ML
0.5 INJECTION, SOLUTION INFILTRATION; PERINEURAL ONCE AS NEEDED
Status: DISCONTINUED | OUTPATIENT
Start: 2024-12-04 | End: 2024-12-04 | Stop reason: HOSPADM

## 2024-12-04 RX ORDER — METOPROLOL TARTRATE 1 MG/ML
INJECTION, SOLUTION INTRAVENOUS AS NEEDED
Status: DISCONTINUED | OUTPATIENT
Start: 2024-12-04 | End: 2024-12-04 | Stop reason: SURG

## 2024-12-04 RX ORDER — FENTANYL CITRATE 50 UG/ML
50 INJECTION, SOLUTION INTRAMUSCULAR; INTRAVENOUS ONCE AS NEEDED
Status: DISCONTINUED | OUTPATIENT
Start: 2024-12-04 | End: 2024-12-04 | Stop reason: HOSPADM

## 2024-12-04 RX ORDER — ROCURONIUM BROMIDE 10 MG/ML
INJECTION, SOLUTION INTRAVENOUS AS NEEDED
Status: DISCONTINUED | OUTPATIENT
Start: 2024-12-04 | End: 2024-12-04 | Stop reason: SURG

## 2024-12-04 RX ORDER — PHENYLEPHRINE HCL IN 0.9% NACL 1 MG/10 ML
SYRINGE (ML) INTRAVENOUS AS NEEDED
Status: DISCONTINUED | OUTPATIENT
Start: 2024-12-04 | End: 2024-12-04 | Stop reason: SURG

## 2024-12-04 RX ORDER — SODIUM CHLORIDE 0.9 % (FLUSH) 0.9 %
3 SYRINGE (ML) INJECTION EVERY 12 HOURS SCHEDULED
Status: DISCONTINUED | OUTPATIENT
Start: 2024-12-04 | End: 2024-12-04 | Stop reason: HOSPADM

## 2024-12-04 RX ORDER — PROMETHAZINE HYDROCHLORIDE 25 MG/1
25 SUPPOSITORY RECTAL ONCE AS NEEDED
Status: DISCONTINUED | OUTPATIENT
Start: 2024-12-04 | End: 2024-12-04 | Stop reason: HOSPADM

## 2024-12-04 RX ORDER — IPRATROPIUM BROMIDE AND ALBUTEROL SULFATE 2.5; .5 MG/3ML; MG/3ML
3 SOLUTION RESPIRATORY (INHALATION) ONCE AS NEEDED
Status: DISCONTINUED | OUTPATIENT
Start: 2024-12-04 | End: 2024-12-04 | Stop reason: HOSPADM

## 2024-12-04 RX ORDER — BUPIVACAINE HYDROCHLORIDE AND EPINEPHRINE 5; 5 MG/ML; UG/ML
INJECTION, SOLUTION EPIDURAL; INTRACAUDAL; PERINEURAL AS NEEDED
Status: DISCONTINUED | OUTPATIENT
Start: 2024-12-04 | End: 2024-12-04 | Stop reason: HOSPADM

## 2024-12-04 RX ORDER — FENTANYL CITRATE 50 UG/ML
50 INJECTION, SOLUTION INTRAMUSCULAR; INTRAVENOUS
Status: DISCONTINUED | OUTPATIENT
Start: 2024-12-04 | End: 2024-12-04 | Stop reason: HOSPADM

## 2024-12-04 RX ORDER — DEXAMETHASONE SODIUM PHOSPHATE 4 MG/ML
INJECTION, SOLUTION INTRA-ARTICULAR; INTRALESIONAL; INTRAMUSCULAR; INTRAVENOUS; SOFT TISSUE AS NEEDED
Status: DISCONTINUED | OUTPATIENT
Start: 2024-12-04 | End: 2024-12-04 | Stop reason: SURG

## 2024-12-04 RX ORDER — DROPERIDOL 2.5 MG/ML
0.62 INJECTION, SOLUTION INTRAMUSCULAR; INTRAVENOUS
Status: DISCONTINUED | OUTPATIENT
Start: 2024-12-04 | End: 2024-12-04 | Stop reason: HOSPADM

## 2024-12-04 RX ORDER — MIDAZOLAM HYDROCHLORIDE 1 MG/ML
1 INJECTION, SOLUTION INTRAMUSCULAR; INTRAVENOUS
Status: DISCONTINUED | OUTPATIENT
Start: 2024-12-04 | End: 2024-12-04 | Stop reason: HOSPADM

## 2024-12-04 RX ORDER — FENTANYL CITRATE 50 UG/ML
INJECTION, SOLUTION INTRAMUSCULAR; INTRAVENOUS AS NEEDED
Status: DISCONTINUED | OUTPATIENT
Start: 2024-12-04 | End: 2024-12-04 | Stop reason: SURG

## 2024-12-04 RX ORDER — OXYCODONE AND ACETAMINOPHEN 7.5; 325 MG/1; MG/1
1 TABLET ORAL EVERY 4 HOURS PRN
Status: DISCONTINUED | OUTPATIENT
Start: 2024-12-04 | End: 2024-12-04 | Stop reason: HOSPADM

## 2024-12-04 RX ORDER — PROPOFOL 10 MG/ML
INJECTION, EMULSION INTRAVENOUS AS NEEDED
Status: DISCONTINUED | OUTPATIENT
Start: 2024-12-04 | End: 2024-12-04 | Stop reason: SURG

## 2024-12-04 RX ORDER — SODIUM CHLORIDE 0.9 % (FLUSH) 0.9 %
3-10 SYRINGE (ML) INJECTION AS NEEDED
Status: DISCONTINUED | OUTPATIENT
Start: 2024-12-04 | End: 2024-12-04 | Stop reason: HOSPADM

## 2024-12-04 RX ORDER — LABETALOL HYDROCHLORIDE 5 MG/ML
5 INJECTION, SOLUTION INTRAVENOUS
Status: DISCONTINUED | OUTPATIENT
Start: 2024-12-04 | End: 2024-12-04 | Stop reason: HOSPADM

## 2024-12-04 RX ORDER — EPHEDRINE SULFATE 50 MG/ML
5 INJECTION, SOLUTION INTRAVENOUS ONCE AS NEEDED
Status: DISCONTINUED | OUTPATIENT
Start: 2024-12-04 | End: 2024-12-04 | Stop reason: HOSPADM

## 2024-12-04 RX ORDER — EPHEDRINE SULFATE 50 MG/ML
INJECTION INTRAVENOUS AS NEEDED
Status: DISCONTINUED | OUTPATIENT
Start: 2024-12-04 | End: 2024-12-04 | Stop reason: SURG

## 2024-12-04 RX ORDER — HYDROCODONE BITARTRATE AND ACETAMINOPHEN 5; 325 MG/1; MG/1
1 TABLET ORAL ONCE AS NEEDED
Status: DISCONTINUED | OUTPATIENT
Start: 2024-12-04 | End: 2024-12-04 | Stop reason: HOSPADM

## 2024-12-04 RX ORDER — LIDOCAINE HYDROCHLORIDE 20 MG/ML
INJECTION, SOLUTION INFILTRATION; PERINEURAL AS NEEDED
Status: DISCONTINUED | OUTPATIENT
Start: 2024-12-04 | End: 2024-12-04 | Stop reason: SURG

## 2024-12-04 RX ADMIN — FAMOTIDINE 20 MG: 10 INJECTION INTRAVENOUS at 20:30

## 2024-12-04 RX ADMIN — DROPERIDOL 1.25 MG: 2.5 INJECTION, SOLUTION INTRAMUSCULAR; INTRAVENOUS at 08:55

## 2024-12-04 RX ADMIN — Medication 100 MCG: at 09:08

## 2024-12-04 RX ADMIN — OXYCODONE AND ACETAMINOPHEN 1 TABLET: 5; 325 TABLET ORAL at 11:43

## 2024-12-04 RX ADMIN — SENNOSIDES AND DOCUSATE SODIUM 2 TABLET: 50; 8.6 TABLET ORAL at 17:52

## 2024-12-04 RX ADMIN — DULOXETINE HYDROCHLORIDE 120 MG: 60 CAPSULE, DELAYED RELEASE ORAL at 11:43

## 2024-12-04 RX ADMIN — EPHEDRINE SULFATE 10 MG: 50 INJECTION INTRAVENOUS at 09:14

## 2024-12-04 RX ADMIN — DEXAMETHASONE SODIUM PHOSPHATE 8 MG: 4 INJECTION, SOLUTION INTRAMUSCULAR; INTRAVENOUS at 09:08

## 2024-12-04 RX ADMIN — OXYCODONE AND ACETAMINOPHEN 1 TABLET: 5; 325 TABLET ORAL at 20:30

## 2024-12-04 RX ADMIN — FAMOTIDINE 20 MG: 10 INJECTION INTRAVENOUS at 08:31

## 2024-12-04 RX ADMIN — Medication 100 MCG: at 09:11

## 2024-12-04 RX ADMIN — PIPERACILLIN AND TAZOBACTAM 3.38 G: 3; .375 INJECTION, POWDER, FOR SOLUTION INTRAVENOUS at 22:16

## 2024-12-04 RX ADMIN — DEXMEDETOMIDINE HCL 10 MCG: 100 INJECTION INTRAVENOUS at 08:55

## 2024-12-04 RX ADMIN — SUGAMMADEX 100 MG: 100 INJECTION, SOLUTION INTRAVENOUS at 09:55

## 2024-12-04 RX ADMIN — DEXMEDETOMIDINE HCL 5 MCG: 100 INJECTION INTRAVENOUS at 09:28

## 2024-12-04 RX ADMIN — HYDROMORPHONE HYDROCHLORIDE 0.5 MG: 1 INJECTION, SOLUTION INTRAMUSCULAR; INTRAVENOUS; SUBCUTANEOUS at 09:23

## 2024-12-04 RX ADMIN — PIPERACILLIN AND TAZOBACTAM 3.38 G: 3; .375 INJECTION, POWDER, FOR SOLUTION INTRAVENOUS at 05:08

## 2024-12-04 RX ADMIN — LAMOTRIGINE 200 MG: 100 TABLET ORAL at 11:42

## 2024-12-04 RX ADMIN — HYDROXYZINE HYDROCHLORIDE 50 MG: 25 TABLET ORAL at 11:42

## 2024-12-04 RX ADMIN — HYDROMORPHONE HYDROCHLORIDE 0.5 MG: 1 INJECTION, SOLUTION INTRAMUSCULAR; INTRAVENOUS; SUBCUTANEOUS at 10:30

## 2024-12-04 RX ADMIN — OXYCODONE AND ACETAMINOPHEN 1 TABLET: 5; 325 TABLET ORAL at 15:57

## 2024-12-04 RX ADMIN — FENTANYL CITRATE 25 MCG: 50 INJECTION, SOLUTION INTRAMUSCULAR; INTRAVENOUS at 09:55

## 2024-12-04 RX ADMIN — PIPERACILLIN AND TAZOBACTAM 3.38 G: 3; .375 INJECTION, POWDER, FOR SOLUTION INTRAVENOUS at 14:33

## 2024-12-04 RX ADMIN — ROCURONIUM BROMIDE 70 MG: 10 INJECTION, SOLUTION INTRAVENOUS at 09:01

## 2024-12-04 RX ADMIN — SUGAMMADEX 200 MG: 100 INJECTION, SOLUTION INTRAVENOUS at 09:52

## 2024-12-04 RX ADMIN — SODIUM CHLORIDE, SODIUM LACTATE, POTASSIUM CHLORIDE, CALCIUM CHLORIDE 9 ML/HR: 20; 30; 600; 310 INJECTION, SOLUTION INTRAVENOUS at 08:31

## 2024-12-04 RX ADMIN — PROPOFOL 160 MG: 10 INJECTION, EMULSION INTRAVENOUS at 09:00

## 2024-12-04 RX ADMIN — METOPROLOL TARTRATE 2 MG: 5 INJECTION INTRAVENOUS at 09:58

## 2024-12-04 RX ADMIN — FENTANYL CITRATE 25 MCG: 50 INJECTION, SOLUTION INTRAMUSCULAR; INTRAVENOUS at 09:47

## 2024-12-04 RX ADMIN — LIDOCAINE HYDROCHLORIDE 100 MG: 20 INJECTION, SOLUTION INFILTRATION; PERINEURAL at 09:00

## 2024-12-04 RX ADMIN — ESMOLOL HYDROCHLORIDE 35 MG: 10 INJECTION, SOLUTION INTRAVENOUS at 09:37

## 2024-12-04 NOTE — PLAN OF CARE
Goal Outcome Evaluation:              Outcome Evaluation: VSS. MOrphine for pain x1. Consent for lap dilshad signed and in chart. NPO after midnight. Possible home tomorrow after surghery

## 2024-12-04 NOTE — ANESTHESIA PROCEDURE NOTES
Airway  Urgency: elective    Date/Time: 12/4/2024 9:04 AM  End Time:12/4/2024 9:05 AM  Difficult airway    General Information and Staff    Patient location during procedure: OR  Anesthesiologist: Wayne Ashby DO    Indications and Patient Condition  Indications for airway management: airway protection    Preoxygenated: yes  Mask difficulty assessment: 2 - vent by mask + OA or adjuvant +/- NMBA    Final Airway Details  Final airway type: endotracheal airway      Successful airway: ETT  Cuffed: yes   Successful intubation technique: video laryngoscopy  Endotracheal tube insertion site: oral  Blade: CMAC  Blade size: D  ETT size (mm): 7.0  Cormack-Lehane Classification: grade I - full view of glottis  Placement verified by: capnometry   Cuff volume (mL): 8  Measured from: lips  Number of attempts at approach: 1  Assessment: lips, teeth, and gum same as pre-op and atraumatic intubation

## 2024-12-04 NOTE — ANESTHESIA POSTPROCEDURE EVALUATION
Patient: Caitlin Olivera    Procedure Summary       Date: 12/02/24 Room / Location:  [facility]    Anesthesia Start: 1712 Anesthesia Stop: 1834    Procedure: ENDOSCOPIC RETROGRADE CHOLANGIOPANCREATOGRAPHY sphincterotomy, stone removal Diagnosis:     Surgeons: Dami Sánchez MD Provider: Susi Coyle MD    Anesthesia Type: general ASA Status: 3            Anesthesia Type: general    Vitals  Vitals Value Taken Time   /71 12/02/24 1842   Temp     Pulse 92 12/02/24 1844   Resp 16 12/02/24 1841   SpO2 97 % 12/02/24 1844   Vitals shown include unfiled device data.        Post Anesthesia Care and Evaluation    Anesthetic complications: No anesthetic complications

## 2024-12-04 NOTE — OP NOTE
Operative Note :  Michelle Calles MD    Caitlin DavilaJoel  1960    Procedure Date: 12/04/24    Pre-op Diagnosis:  Calculus of bile duct with chronic cholecystitis with obstruction [K80.45]  Biliary tract obstruction [K83.1]    Post-op Diagnosis:  Calculus of bile duct with acute cholecystitis without biliary obstruction  Recent choledocholithiasis requiring ERCP    Procedure:   Laparoscopic cholecystectomy with cholangiogram    Surgeon: Michelle Calles MD    Assistant: Freida Wilson PA-C (Freida was responsible for laparoscopic manipulation of the gallbladder during critical portions of the dissection, handling of the laparoscopic camera, closure of all surgical incisions, and application of topical sterile dressings)    Anesthesia:  General (general endotracheal tube)    Estimated Blood Loss: 100ml    Specimens:  Gallbladder    Complications: None    Indications: The patient is a 64-year-old lady who transferred from an outside hospital with hyperbilirubinemia and evidence of cholelithiasis.  Once here, she underwent MRCP that showed multiple common bile duct stones requiring ERCP with common bile duct stone extraction 2 days ago.  Her hyperbilirubinemia has gradually improved and I have recommended proceeding with laparoscopic cholecystectomy and cholangiogram today.  She has been counseled on the risks of the procedure which include but are not limited to bleeding, wound infection, common bile duct injury, and postoperative bile leak.  Despite the risks of surgery, she has consented to proceed.    Findings: Normal cholangiogram, acutely inflamed gallbladder consistent with acute cholecystitis    Description of procedure:  The patient was brought to the operating room and placed on the OR table in supine position.  An endotracheal tube was inserted, and general anesthesia was induced.  The anterior abdominal wall was shaved, prepped, and draped in a sterile fashion.  A surgical timeout was then  completed.  A curvilinear infraumbilical skin incision was made after instillation of 0.5% Marcaine with epinephrine.  The umbilical stalk was grasped and elevated, and a longitudinal fasciotomy made.  A Layton trocar was inserted and secured with 2 separate 0 Vicryl stay sutures.  The abdomen was then insufflated.  Under direct visualization, 3 additional 5 mm trocars were then placed within the subxiphoid and subcostal space on the right.  The gallbladder was retracted cephalad and infundibulum retracted inferiorly.  The gallbladder itself was markedly edematous and inflamed.  The cystic duct and cystic artery were slowly dissected out circumferentially until a firm critical view was obtained.  A single Hemoclip was placed across the cystic duct at its junction with the gallbladder infundibulum and 3 hemoclips were placed across the cystic artery.  A small hole was created on the anterior wall of the cystic duct, and a cholangiogram catheter inserted through a right upper quadrant angiocatheter.  The catheter was secured within the duct with an additional Hemoclip and a cholangiogram was then obtained.  The cholangiogram showed filling of the cystic duct and common bile duct, retrograde filling of the intrahepatic ducts, and spillage into the duodenum with no filling defects appreciable.  The catheter was then withdrawn and 2 additional hemoclips placed across the cystic duct.  The cystic duct and artery were then transected, leaving 2 clips behind on both of the stumps.  The gallbladder was then slowly dissected off of the undersurface of the liver using electrocautery and it was removed from the peritoneal cavity within an Endo Catch bag at the umbilical trocar site.  The gallbladder was passed off to pathology.  The abdomen was then reinsufflated and right upper quadrant inspected. Warm normal saline was irrigated and suctioned dry.  There were a few areas of venous bleeding along the gallbladder fossa that  were controlled with electrocautery.  Additional saline irrigation was instilled into the right upper quadrant and suctioned dry, with no evidence for bile leak or any ongoing bleeding.  The entire gallbladder fossa appeared hemostatic.  All trocars were then removed under direct visualization and the abdomen desufflated.  The umbilical fascial incision was closed using a new 0 Vicryl figure-of-eight suture, all skin incisions closed with 4-0 Vicryl subcuticular stitches, and then each was dressed with Exofin glue.  The patient was then extubated and transferred to PACU in stable condition.  All counts were correct per nursing.    Recommendations:  The gallbladder was more inflamed than I anticipated, and I would anticipate she will need to stay for 1 more night of IV antibiotics and observation and will be stable for discharge to home tomorrow morning.    Michelle Calles MD  General, Robotic, and Endoscopic Surgery  [facility]    [address], [address]  [place] [address]  P: [phone]  F: [phone]

## 2024-12-04 NOTE — ANESTHESIA PREPROCEDURE EVALUATION
Anesthesia Evaluation     Patient summary reviewed and Nursing notes reviewed   NPO Solid Status: > 8 hours  NPO Liquid Status: > 4 hours           Airway   Mallampati: III  Neck ROM: full  No difficulty expected, Difficult intubation highly probable and Small opening  Dental - normal exam     Pulmonary     breath sounds clear to auscultation  (+) a smoker Former,  Cardiovascular     Rhythm: regular    (+) hyperlipidemia      Neuro/Psych  GI/Hepatic/Renal/Endo    (+) obesity, liver disease history of elevated LFT    Musculoskeletal     Abdominal   (+) obese   Substance History      OB/GYN          Other   arthritis,                 Anesthesia Plan    ASA 3     general     (CMAC, CMAC required for intubation 2 days ago for ERCP)  intravenous induction     Anesthetic plan, risks, benefits, and alternatives have been provided, discussed and informed consent has been obtained with: patient.    CODE STATUS:    Code Status (Patient has no pulse and is not breathing): CPR (Attempt to Resuscitate)  Medical Interventions (Patient has pulse or is breathing): Full Support

## 2024-12-04 NOTE — PROGRESS NOTES
Name: Caitlin Olivera ADMIT: 2024   : 1960  PCP: Glory Laws MD    MRN: [id number] LOS: 1 days   AGE/SEX: 64 y.o. female  ROOM: [facility]     Subjective   Subjective   Feeling much better after ERCP       Objective   Objective   Vital Signs  Temp:  [96.9 °F (36.1 °C)-99.8 °F (37.7 °C)] 98.8 °F (37.1 °C)  Heart Rate:  [72-96] 83  Resp:  [16] 16  BP: (125-141)/(72-87) 135/81  SpO2:  [94 %-96 %] 94 %  on  Flow (L/min) (Oxygen Therapy):  [2] 2;   Device (Oxygen Therapy): nasal cannula  Body mass index is 34.7 kg/m².  Physical Exam  Vitals reviewed.   Constitutional:       General: She is not in acute distress.  Eyes:      General: Scleral icterus present.   Cardiovascular:      Rate and Rhythm: Normal rate and regular rhythm.   Pulmonary:      Effort: No respiratory distress.   Abdominal:      Tenderness: There is abdominal tenderness (very slight RUQ).   Skin:     General: Skin is warm and dry.   Neurological:      Mental Status: She is alert.   Psychiatric:         Mood and Affect: Mood normal.       Results Review     I reviewed the patient's new clinical results.  Results from last 7 days   Lab Units 24  0551 24  0649 24  0033   WBC 10*3/mm3 9.45 13.18* 12.38*   HEMOGLOBIN g/dL 11.5* 12.4 12.5   PLATELETS 10*3/mm3 270 272 272     Results from last 7 days   Lab Units 24  0551 24  0649 24  0033   SODIUM mmol/L 139 137 137   POTASSIUM mmol/L 4.1 3.7 4.0   CHLORIDE mmol/L 106 100 101   CO2 mmol/L 23.5 23.5 25.0   BUN mg/dL 13 14 15   CREATININE mg/dL 0.81 1.01* 0.99   GLUCOSE mg/dL 136* 79 89   EGFR mL/min/1.73 82.2 63.1 64.6     Results from last 7 days   Lab Units 24  0551 24  0649 24  0033   ALBUMIN g/dL 3.1* 3.7 3.7   BILIRUBIN mg/dL 2.8* 6.5* 6.1*   ALK PHOS U/L 216* 230* 237*   AST (SGOT) U/L 120* 195* 223*   ALT (SGPT) U/L 295* 397* 440*     Results from last 7 days   Lab Units 24  0551 24  0649 24  0033   CALCIUM  "mg/dL 9.0 9.6 9.4   ALBUMIN g/dL 3.1* 3.7 3.7       No results found for: \"HGBA1C\", \"POCGLU\"    MRI abdomen w wo contrast mrcp    Result Date: 12/2/2024  1. Cholelithiasis with findings suggesting cholecystitis. 2. Moderate intra and extrahepatic bili duct dilation. Common bile duct is dilated to the level of the distalmost CBD/ampulla with multiple suspected subcentimeter stones in the distal CBD (choledocholithiasis). 3. Small focus of diffusion restriction at the left kidney superior pole may correspond with a small hemorrhagic/proteinaceous cyst. Recommend follow-up abdominal MRI in 6-12 months to demonstrate stability. 4. 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 Consider follow-up abdominal MRI/MRCP in 1 year per American College of radiology (ACR) guidelines. 5. Hepatic steatosis with calculated fat fraction of 17%.   This report was finalized on 12/2/2024 2:44 PM by Dr. Arnulfo Torres M.D on Workstation: [id number]       I have personally reviewed all medications:  Scheduled Medications  DULoxetine, 120 mg, Oral, Daily  famotidine, 20 mg, Intravenous, Q12H  lamoTRIgine, 200 mg, Oral, Daily  piperacillin-tazobactam, 3.375 g, Intravenous, Q8H    Infusions   Diet  Diet: Gastrointestinal; Fat-Restricted; Fluid Consistency: Thin (IDDSI 0)  NPO Diet NPO Type: Strict NPO    I have personally reviewed:  [x]  Laboratory   []  Microbiology   [x]  Radiology   []  EKG/Telemetry  []  Cardiology/Vascular   []  Pathology    []  Records       Assessment/Plan     Active Hospital Problems    Diagnosis  POA    **Calculus of gallbladder and bile duct with chronic cholecystitis with obstruction [K80.65]  Yes    Cholecystitis [K81.9]  Yes    Obesity (BMI 30-39.9) [E66.9]  Yes    Biliary tract obstruction [K83.1]  Yes    Calculus of bile duct without cholecystitis with obstruction [K80.51]  Yes      Resolved Hospital Problems   No resolved problems to display.       64 y.o. " female admitted with Calculus of gallbladder and bile duct with chronic cholecystitis with obstruction. S/p ERCP 12/2, planning dilshad tomorrow.    Doing much better, huge improvement in LFTs today. Await dilshad tomorrow and dispo plan will be afterward if tolerating diet. Daughter at bedside expressed desire that patient not be discharged until Thursday.     Uncler if abx truly needed but will keep on board until surgery at least      Dispo: Home tomorrow/Thurs      Arnulfo Palmer MD  [facility]  12/03/24  22:46 EST

## 2024-12-04 NOTE — PROGRESS NOTES
Name: Caitlin Olivera ADMIT: 2024   : 1960  PCP: Glory Laws MD    MRN: [id number] LOS: 2 days   AGE/SEX: 64 y.o. female  ROOM: [facility]     Subjective   Subjective   Feeling ok. Expected soreness post op       Objective   Objective   Vital Signs  Temp:  [97.1 °F (36.2 °C)-98.8 °F (37.1 °C)] 97.1 °F (36.2 °C)  Heart Rate:  [56-95] 61  Resp:  [16-18] 18  BP: (135-177)/(76-98) 146/86  SpO2:  [92 %-99 %] 95 %  on  Flow (L/min) (Oxygen Therapy):  [2-4] 2;   Device (Oxygen Therapy): nasal cannula  Body mass index is 34.57 kg/m².  Physical Exam  Vitals reviewed.   Constitutional:       General: She is not in acute distress.  Eyes:      General: No scleral icterus.  Cardiovascular:      Rate and Rhythm: Normal rate and regular rhythm.   Pulmonary:      Effort: No respiratory distress.   Abdominal:      Comments: Deferred   Skin:     General: Skin is warm and dry.   Neurological:      Mental Status: She is alert.   Psychiatric:         Mood and Affect: Mood normal.       Results Review     I reviewed the patient's new clinical results.  Results from last 7 days   Lab Units 24  0524  0551 24  0649 24  0033   WBC 10*3/mm3 5.94 9.45 13.18* 12.38*   HEMOGLOBIN g/dL 11.3* 11.5* 12.4 12.5   PLATELETS 10*3/mm3 278 270 272 272     Results from last 7 days   Lab Units 24  0524  0551 24  0649 24  0033   SODIUM mmol/L 139 139 137 137   POTASSIUM mmol/L 4.0 4.1 3.7 4.0   CHLORIDE mmol/L 106 106 100 101   CO2 mmol/L 25.0 23.5 23.5 25.0   BUN mg/dL 16 13 14 15   CREATININE mg/dL 0.79 0.81 1.01* 0.99   GLUCOSE mg/dL 96 136* 79 89   EGFR mL/min/1.73 84.7 82.2 63.1 64.6     Results from last 7 days   Lab Units 24  0527 24  0551 24  0649 24  0033   ALBUMIN g/dL 3.1* 3.1* 3.7 3.7   BILIRUBIN mg/dL 1.4* 2.8* 6.5* 6.1*   ALK PHOS U/L 194* 216* 230* 237*   AST (SGOT) U/L 96* 120* 195* 223*   ALT (SGPT) U/L 215* 295* 397* 440*     Results  "from last 7 days   Lab Units 12/04/24  0527 12/03/24  0551 12/02/24  0649 12/02/24  0033   CALCIUM mg/dL 8.7 9.0 9.6 9.4   ALBUMIN g/dL 3.1* 3.1* 3.7 3.7       No results found for: \"HGBA1C\", \"POCGLU\"    FL Cholangiogram Operative    Result Date: 12/4/2024  Fluoroscopic guidance for intraoperative cholangiogram  This report was finalized on 12/4/2024 10:20 AM by Dr. Barrera Blount M.D on Workstation: [id number]       I have personally reviewed all medications:  Scheduled Medications  DULoxetine, 120 mg, Oral, Daily  famotidine, 20 mg, Intravenous, Q12H  lamoTRIgine, 200 mg, Oral, Daily  piperacillin-tazobactam, 3.375 g, Intravenous, Q8H    Infusions   Diet  Diet: Regular/House; Fluid Consistency: Thin (IDDSI 0)    I have personally reviewed:  [x]  Laboratory   []  Microbiology   []  Radiology   []  EKG/Telemetry  []  Cardiology/Vascular   []  Pathology    []  Records       Assessment/Plan     Active Hospital Problems    Diagnosis  POA    **Calculus of gallbladder and bile duct with chronic cholecystitis with obstruction [K80.65]  Yes    Cholecystitis [K81.9]  Yes    Obesity (BMI 30-39.9) [E66.9]  Yes    Biliary tract obstruction [K83.1]  Yes    Calculus of bile duct without cholecystitis with obstruction [K80.51]  Yes      Resolved Hospital Problems   No resolved problems to display.       64 y.o. female admitted with Calculus of gallbladder and bile duct with chronic cholecystitis with obstruction. S/p ERCP 12/2, now status post dilshad.    Doing well postop.  LFTs much improved this morning even prior to surgery.  Will continue to follow.    Discussed care with Dr. Calles.  She recommends antibiotics at least through tomorrow.  Potential DC then if she is doing well.    Discussed with family and RN at bedside.      DVT prophylaxis: SCDs.  She is ambulating a lot as well  Dispo: Home tomorrow most likely      Arnulfo Palmer MD  [facility]  12/04/24  17:24 EST  "

## 2024-12-05 VITALS
DIASTOLIC BLOOD PRESSURE: 88 MMHG | OXYGEN SATURATION: 93 % | RESPIRATION RATE: 18 BRPM | SYSTOLIC BLOOD PRESSURE: 137 MMHG | HEART RATE: 88 BPM | HEIGHT: 60 IN | BODY MASS INDEX: 34.75 KG/M2 | TEMPERATURE: 99.2 F | WEIGHT: 177 LBS

## 2024-12-05 LAB
ALBUMIN SERPL-MCNC: 3.3 G/DL (ref 3.5–5.2)
ALBUMIN/GLOB SERPL: 1.3 G/DL
ALP SERPL-CCNC: 184 U/L (ref 39–117)
ALT SERPL W P-5'-P-CCNC: 205 U/L (ref 1–33)
ANION GAP SERPL CALCULATED.3IONS-SCNC: 10.3 MMOL/L (ref 5–15)
AST SERPL-CCNC: 110 U/L (ref 1–32)
BASOPHILS # BLD AUTO: 0.03 10*3/MM3 (ref 0–0.2)
BASOPHILS NFR BLD AUTO: 0.3 % (ref 0–1.5)
BILIRUB SERPL-MCNC: 0.9 MG/DL (ref 0–1.2)
BUN SERPL-MCNC: 17 MG/DL (ref 8–23)
BUN/CREAT SERPL: 21.8 (ref 7–25)
CALCIUM SPEC-SCNC: 8.7 MG/DL (ref 8.6–10.5)
CHLORIDE SERPL-SCNC: 103 MMOL/L (ref 98–107)
CO2 SERPL-SCNC: 22.7 MMOL/L (ref 22–29)
CREAT SERPL-MCNC: 0.78 MG/DL (ref 0.57–1)
CYTO UR: NORMAL
DEPRECATED RDW RBC AUTO: 39.9 FL (ref 37–54)
EGFRCR SERPLBLD CKD-EPI 2021: 86 ML/MIN/1.73
EOSINOPHIL # BLD AUTO: 0.07 10*3/MM3 (ref 0–0.4)
EOSINOPHIL NFR BLD AUTO: 0.8 % (ref 0.3–6.2)
ERYTHROCYTE [DISTWIDTH] IN BLOOD BY AUTOMATED COUNT: 13.1 % (ref 12.3–15.4)
GLOBULIN UR ELPH-MCNC: 2.6 GM/DL
GLUCOSE SERPL-MCNC: 122 MG/DL (ref 65–99)
HCT VFR BLD AUTO: 33.6 % (ref 34–46.6)
HGB BLD-MCNC: 11.4 G/DL (ref 12–15.9)
IMM GRANULOCYTES # BLD AUTO: 0.1 10*3/MM3 (ref 0–0.05)
IMM GRANULOCYTES NFR BLD AUTO: 1.1 % (ref 0–0.5)
LAB AP CASE REPORT: NORMAL
LYMPHOCYTES # BLD AUTO: 1.85 10*3/MM3 (ref 0.7–3.1)
LYMPHOCYTES NFR BLD AUTO: 21.1 % (ref 19.6–45.3)
MCH RBC QN AUTO: 28.4 PG (ref 26.6–33)
MCHC RBC AUTO-ENTMCNC: 33.9 G/DL (ref 31.5–35.7)
MCV RBC AUTO: 83.8 FL (ref 79–97)
MONOCYTES # BLD AUTO: 0.86 10*3/MM3 (ref 0.1–0.9)
MONOCYTES NFR BLD AUTO: 9.8 % (ref 5–12)
NEUTROPHILS NFR BLD AUTO: 5.87 10*3/MM3 (ref 1.7–7)
NEUTROPHILS NFR BLD AUTO: 66.9 % (ref 42.7–76)
NRBC BLD AUTO-RTO: 0 /100 WBC (ref 0–0.2)
PATH REPORT.FINAL DX SPEC: NORMAL
PATH REPORT.GROSS SPEC: NORMAL
PLATELET # BLD AUTO: 269 10*3/MM3 (ref 140–450)
PMV BLD AUTO: 8.9 FL (ref 6–12)
POTASSIUM SERPL-SCNC: 3.6 MMOL/L (ref 3.5–5.2)
PROT SERPL-MCNC: 5.9 G/DL (ref 6–8.5)
RBC # BLD AUTO: 4.01 10*6/MM3 (ref 3.77–5.28)
SODIUM SERPL-SCNC: 136 MMOL/L (ref 136–145)
WBC NRBC COR # BLD AUTO: 8.78 10*3/MM3 (ref 3.4–10.8)

## 2024-12-05 PROCEDURE — 80053 COMPREHEN METABOLIC PANEL: CPT | Performed by: SURGERY

## 2024-12-05 PROCEDURE — 99024 POSTOP FOLLOW-UP VISIT: CPT

## 2024-12-05 PROCEDURE — 25010000002 PIPERACILLIN SOD-TAZOBACTAM PER 1 G: Performed by: SURGERY

## 2024-12-05 PROCEDURE — 85025 COMPLETE CBC W/AUTO DIFF WBC: CPT | Performed by: SURGERY

## 2024-12-05 RX ORDER — OXYCODONE AND ACETAMINOPHEN 5; 325 MG/1; MG/1
1 TABLET ORAL EVERY 4 HOURS PRN
Qty: 15 TABLET | Refills: 0 | Status: SHIPPED | OUTPATIENT
Start: 2024-12-05

## 2024-12-05 RX ADMIN — DULOXETINE HYDROCHLORIDE 120 MG: 60 CAPSULE, DELAYED RELEASE ORAL at 08:09

## 2024-12-05 RX ADMIN — PIPERACILLIN AND TAZOBACTAM 3.38 G: 3; .375 INJECTION, POWDER, FOR SOLUTION INTRAVENOUS at 06:18

## 2024-12-05 RX ADMIN — OXYCODONE AND ACETAMINOPHEN 1 TABLET: 5; 325 TABLET ORAL at 08:24

## 2024-12-05 RX ADMIN — HYDROXYZINE HYDROCHLORIDE 50 MG: 25 TABLET ORAL at 08:14

## 2024-12-05 RX ADMIN — OXYCODONE AND ACETAMINOPHEN 1 TABLET: 5; 325 TABLET ORAL at 00:59

## 2024-12-05 RX ADMIN — OXYCODONE AND ACETAMINOPHEN 1 TABLET: 5; 325 TABLET ORAL at 04:50

## 2024-12-05 RX ADMIN — LAMOTRIGINE 200 MG: 100 TABLET ORAL at 08:09

## 2024-12-05 NOTE — ANESTHESIA POSTPROCEDURE EVALUATION
Patient: Caitlin Olivera    Procedure Summary       Date: 12/04/24 Room / Location: [facility] OR [address] [place] [address] MAIN OR    Anesthesia Start: 0853 Anesthesia Stop: 1012    Procedure: Laparoscopic cholecystectomy with cholangiogram (Abdomen) Diagnosis:       Calculus of bile duct with chronic cholecystitis with obstruction      Biliary tract obstruction      (Calculus of bile duct with chronic cholecystitis with obstruction [K80.45])      (Biliary tract obstruction [K83.1])    Surgeons: Michelle Calles MD Provider: Wayne Ashby DO    Anesthesia Type: general ASA Status: 3            Anesthesia Type: general    Vitals  Vitals Value Taken Time   /84 12/04/24 1047   Temp 36.4 °C (97.6 °F) 12/04/24 1045   Pulse 55 12/04/24 1047   Resp 16 12/04/24 1045   SpO2 95 % 12/04/24 1047   Vitals shown include unfiled device data.        Anesthesia Post Evaluation

## 2024-12-05 NOTE — PLAN OF CARE
Goal Outcome Evaluation:           Progress: improving  Outcome Evaluation: sent for surgery this AM. lap x 4 with glue, well approximated. 's/80's. 94% on 2lpm o2 while sleeping. amb in [place], up with assist. IS at bedside. medicated with percocet for abominal pain. also reporting chronic arthritic pain in shoulders, knees, and feet. tolerating regular diet. started on stool softeners. zosyn infused. plans for d/c tomorrow.

## 2024-12-05 NOTE — PROGRESS NOTES
Case Management Discharge Note      Final Note: Discharged home. Venus Schrader RN    Provided Post Acute Provider List?: N/A  N/A Provider List Comment: Denies needs. Plan is home    Selected Continued Care - Discharged on 12/5/2024 Admission date: 12/1/2024 - Discharge disposition: Home or Self Care         Transportation Services  Private: Car    Final Discharge Disposition Code: 01 - home or self-care

## 2024-12-05 NOTE — PROGRESS NOTES
Acute Care General Surgery Progress Note    Patient: Caitlin Olivera  YOB: 1960  MRN: [id number]      Assessment  Caitlin Olivera is a 64 y.o. female who is POD 1 laparoscopic cholecystectomy with intraoperative cholangiogram.  Cholangiogram demonstrated no filling defects.  Patient doing well this morning, incisions in good order, she feels ready for discharge home.  Bilirubin has normalized this morning, H&H and LFTs have remained stable.  She is afebrile with normal vitals.    Plan  Okay for discharge home from a general surgery standpoint, discussed postop instructions, she verbalized understanding  Will need to follow-up with Dr. Calles in 2 weeks      Subjective  Denies nausea or vomiting.  Abdominal pain tolerable with medication.  Tolerating a diet.    Objective    Vitals:    12/05/24 0432   BP: 149/85   Pulse: 69   Resp: 18   Temp: 97.6 °F (36.4 °C)   SpO2: 94%           Physical Exam  Constitutional: Alert, oriented, no acute distress  Respiratory: Normal work of breathing, Symmetric excursion  Cardiovascular: Well pefused, no jugular venous distention evident   Abdominal: Soft, nondistended, expected postop tenderness, incision sites are clean, dry, intact with no surrounding erythema  Skin: Warm, Dry      Laboratory Results  Results from last 7 days   Lab Units 12/05/24  0358 12/04/24 0527 12/03/24  0551 12/02/24  0649 12/02/24  0033   WBC 10*3/mm3 8.78 5.94 9.45 13.18* 12.38*   HEMOGLOBIN g/dL 11.4* 11.3* 11.5* 12.4 12.5   HEMATOCRIT % 33.6* 33.8* 35.7 38.8 38.7   PLATELETS 10*3/mm3 269 278 270 272 272     Results from last 7 days   Lab Units 12/05/24  0358 12/04/24  0527 12/03/24  0551   SODIUM mmol/L 136 139 139   POTASSIUM mmol/L 3.6 4.0 4.1   CHLORIDE mmol/L 103 106 106   CO2 mmol/L 22.7 25.0 23.5   BUN mg/dL 17 16 13   CREATININE mg/dL 0.78 0.79 0.81   CALCIUM mg/dL 8.7 8.7 9.0   BILIRUBIN mg/dL 0.9 1.4* 2.8*   ALK PHOS U/L 184* 194* 216*   ALT (SGPT) U/L 205* 215*  295*   AST (SGOT) U/L 110* 96* 120*   GLUCOSE mg/dL 122* 96 136*     I have personally reviewed 12/5 labs        YURIY Sanchez  Acute Care General Surgery  [facility]    [address], [address]  [place], [place], [address]  P: [phone]  F: [phone]

## 2024-12-05 NOTE — DISCHARGE SUMMARY
Patient Name: Caitlin Olivera  : 1960  MRN: [id number]    Date of Admission: 2024  Date of Discharge:  2024  Primary Care Physician: Glory Laws MD      Chief Complaint:   No chief complaint on file.      Discharge Diagnoses     Active Hospital Problems    Diagnosis  POA    **Calculus of gallbladder and bile duct with chronic cholecystitis with obstruction [K80.65]  Yes    Cholecystitis [K81.9]  Yes    Obesity (BMI 30-39.9) [E66.9]  Yes    Biliary tract obstruction [K83.1]  Yes    Calculus of bile duct without cholecystitis with obstruction [K80.51]  Yes      Resolved Hospital Problems   No resolved problems to display.        Hospital Course     Ms. Olivera is a 64 y.o. female with a history of osteoarthritis who presented to an [facility] ED initially complaining of substernal chest pain and epigastric pain for 4 days starting after her Thanksgiving meal.  Please see the admitting history and physical for further details.  She was found to have elevated LFTs concerning for biliary obstruction and was recommended for transfer to an appropriate facility.  She was transferred here and the biliary GI service was consulted.  MRCP showed cholelithiasis with some pericholecystic edema consistent with cholecystitis.  There was also moderate intra and extrahepatic biliary duct dilatation and dilated CBD concerning for stones.  Patient was treated with IV Zosyn.  She was taken for ERCP with removal of several stones.  LFTs trended down nicely after this but should be repeated in about 2 weeks to ensure return to normal.  She did have some fatty liver on imaging.  General surgery took her for laparoscopic cholecystectomy yesterday morning which she tolerated very well.  Pain is well-controlled and she is eating normally this morning.  Pathology from the gallbladder shows acute necrotizing cholecystitis and cholelithiasis.  She stable for discharge home today.  No further antibiotics are  felt to be indicated at this point.      Incidental note was made on the initial MRI of left kidney cyst as well as subcentimeter pancreatic cystic lesions consistent with IPMN's.  Recommendation was for follow-up abdominal MRI in 6 to 12 months.      Day of Discharge     Subjective:  No events.  She feels good    Physical Exam:  Temp:  [97.1 °F (36.2 °C)-99.2 °F (37.3 °C)] 99.2 °F (37.3 °C)  Heart Rate:  [56-94] 88  Resp:  [16-18] 18  BP: (132-177)/(73-98) 137/88  Body mass index is 34.57 kg/m².  Physical Exam  Vitals reviewed.   Constitutional:       General: She is not in acute distress.  Eyes:      General: No scleral icterus.  Cardiovascular:      Rate and Rhythm: Normal rate and regular rhythm.   Pulmonary:      Effort: No respiratory distress.   Abdominal:      Comments: Deferred   Skin:     General: Skin is warm and dry.   Neurological:      Mental Status: She is alert.   Psychiatric:         Mood and Affect: Mood normal.         Consultants     Consult Orders (all) (From admission, onward)       Start     Ordered    12/02/24 0702  Inpatient Gastroenterology Consult  IN         Specialty:  Gastroenterology  Provider:  Justo Gant MD    12/01/24 2343    12/02/24 0051  Inpatient General Surgery Consult  Once        Specialty:  General Surgery  Provider:  Shanae Reynoso MD    12/02/24 0052                  Procedures     Laparoscopic cholecystectomy with cholangiogram  ERCP    Imaging Results (All)       Procedure Component Value Units Date/Time    IMAGING SCANNED [[id number]] Resulted: 12/01/24     Updated: 12/04/24 1150    FL Cholangiogram Operative [[id number]] Collected: 12/04/24 1018     Updated: 12/04/24 1023    Narrative:      FL CHOLANGIOGRAM OPERATIVE-        INDICATION: Intraoperative cholangiogram     COMPARISON: MRI abdomen December 2, 2024     TECHNIQUE: 2 fluoroscopic cine loops provided. Fluoroscopy time: 17.8  seconds.     FINDINGS:      Cholecystectomy clips. Small catheter placed in the  cystic duct remnant.  Intraoperative cholangiogram performed. Contrast passes quickly into the  duodenum. No persistent filling defects identified.       Impression:      Fluoroscopic guidance for intraoperative cholangiogram     This report was finalized on 12/4/2024 10:20 AM by Dr. Barrera Blount M.D on Workstation: [id number]       FL ercp biliary duct only [[id number]] Collected: 12/02/24 1908     Updated: 12/02/24 1912    Narrative:      FL ERCP BILIARY DUCT ONLY-     HISTORY: 64-year-old female with choledocholithiasis. ERCP for  sphincterotomy and stone removal.     FT 1 min 36 sec   Ka,r 17 mGy   5 Images      This report was finalized on 12/2/2024 7:09 PM by Dr. Adrianna Paige M.D on  Workstation: [id number]       MRI abdomen w wo contrast mrcp [[id number]] Collected: 12/02/24 1406     Updated: 12/02/24 1447    Narrative:      MRI ABDOMEN WITH AND WITHOUT CONTRAST     HISTORY: Biliary obstruction/elevated serum bilirubin levels.     TECHNIQUE: Multiplanar multisequence MRI of the abdomen was performed  before and after the administration of IV Multihance.      COMPARISON: Outside right upper quadrant ultrasound 12/1/2024     FINDINGS: Gallbladder is mildly distended with numerous small  gallstones. Circumferential gallbladder wall thickening measuring up to  4-5 mm with associated diffusion restriction (series 7/image 24 and  series 22/image 51). Pericholecystic edema/inflammatory changes and  small volume pericholecystic free fluid layering along the right hepatic  lobe into the right paracolic gutter (series 6/images 26 and 30).  Increased T2 hyperintense signal/edema surrounding the common duct.  Moderate intra and extrahepatic bili duct dilation. Common bile duct is  dilated up to 1.5 cm (series 9/image 9). Common bile duct is dilated to  the level of the distalmost CBD/ampulla. Multiple suspected  subcentimeter dependent round T2 hypointense filling defects within the  distal most common bile duct  (series 7/image 24 and series 9/image 11).  No abnormal enhancement or soft tissue nodularity at the level of the  ampulla. Nondilated main pancreatic duct.     Noncirrhotic liver morphology. Hepatic steatosis with calculated fat  fraction of 17%. Conventional hepatic arterial anatomy. Patent hepatic  and portal veins. No focal hepatic lesion.     Preserved pancreas parenchymal volume and intrinsic T1 hyperintense  signal. Nondilated main pancreatic duct. Few subcentimeter pancreatic  cystic lesions. For reference in the pancreatic tail (series 6/image 13)  and within the pancreatic head (series 6/image 24). No associated  enhancement or soft tissue nodularity.     Small focus of diffusion restriction/ADC hypointense signal at the left  kidney superior pole (series 22/image 43). Corresponding focus of  intrinsic T1 hyperintense signal (series 12/image 23). No associated  enhancement. Contralateral subcentimeter T1 hyperintense  hemorrhagic/proteinaceous cyst (series 12/image 42).     Normal spleen, adrenal glands and visualized bowel. Few periportal lymph  nodes at the upper limits of normal for size. Reference 1 cm portal  caval lymph node (series 16/image 27). These are most likely  reactive/inflammatory. No enhancing osseous lesion.             Impression:      1. Cholelithiasis with findings suggesting cholecystitis.  2. Moderate intra and extrahepatic bili duct dilation. Common bile duct  is dilated to the level of the distalmost CBD/ampulla with multiple  suspected subcentimeter stones in the distal CBD (choledocholithiasis).  3. Small focus of diffusion restriction at the left kidney superior pole  may correspond with a small hemorrhagic/proteinaceous cyst. Recommend  follow-up abdominal MRI in 6-12 months to demonstrate stability.  4. 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 Consider follow-up abdominal MRI/MRCP in 1  year per American College of  "radiology (ACR) guidelines.  5. Hepatic steatosis with calculated fat fraction of 17%.        This report was finalized on 12/2/2024 2:44 PM by Dr. Arnulfo Torres M.D on Workstation: [id number]       US Outside Films [[id number]] Resulted: 12/02/24 1022     Updated: 12/02/24 1022    Narrative:      This procedure was auto-finalized with no dictation required.              Pertinent Labs     Results from last 7 days   Lab Units 12/05/24 0358 12/04/24 0527 12/03/24 0551 12/02/24  0649   WBC 10*3/mm3 8.78 5.94 9.45 13.18*   HEMOGLOBIN g/dL 11.4* 11.3* 11.5* 12.4   PLATELETS 10*3/mm3 269 278 270 272     Results from last 7 days   Lab Units 12/05/24 0358 12/04/24 0527 12/03/24 0551 12/02/24  0649   SODIUM mmol/L 136 139 139 137   POTASSIUM mmol/L 3.6 4.0 4.1 3.7   CHLORIDE mmol/L 103 106 106 100   CO2 mmol/L 22.7 25.0 23.5 23.5   BUN mg/dL 17 16 13 14   CREATININE mg/dL 0.78 0.79 0.81 1.01*   GLUCOSE mg/dL 122* 96 136* 79   EGFR mL/min/1.73 86.0 84.7 82.2 63.1     Results from last 7 days   Lab Units 12/05/24 0358 12/04/24 0527 12/03/24 0551 12/02/24  0649   ALBUMIN g/dL 3.3* 3.1* 3.1* 3.7   BILIRUBIN mg/dL 0.9 1.4* 2.8* 6.5*   ALK PHOS U/L 184* 194* 216* 230*   AST (SGOT) U/L 110* 96* 120* 195*   ALT (SGPT) U/L 205* 215* 295* 397*     Results from last 7 days   Lab Units 12/05/24 0358 12/04/24 0527 12/03/24  0551 12/02/24  0649   CALCIUM mg/dL 8.7 8.7 9.0 9.6   ALBUMIN g/dL 3.3* 3.1* 3.1* 3.7     Results from last 7 days   Lab Units 12/01/24  1410   LIPASE Units/Liter 76             Invalid input(s): \"LDLCALC\"          Test Results Pending at Discharge     Pending Results       None              Discharge Details        Discharge Medications        New Medications        Instructions Start Date   oxyCODONE-acetaminophen 5-325 MG per tablet  Commonly known as: PERCOCET   1 tablet, Oral, Every 4 Hours PRN             Continue These Medications        Instructions Start Date   amphetamine-dextroamphetamine " 20 MG tablet  Commonly known as: ADDERALL   1 tablet, Oral, Daily      atorvastatin 40 MG tablet  Commonly known as: LIPITOR   40 mg, Daily      azelastine 0.15 % solution nasal spray  Commonly known as: ASTEPRO   1 spray, Nasal, 2 Times Daily      DULoxetine 60 MG capsule  Commonly known as: CYMBALTA   120 mg, Oral, Daily, Patient takes 2 capsules (120 mg) daily      hydrOXYzine 50 MG tablet  Commonly known as: ATARAX   150 mg, Oral, Daily, Bottle states to take 1 tablet (50 mg) by mouth three times daily PRN, patient states she takes 3 tabs (150 mg) PO once daily       lamoTRIgine 200 MG tablet  Commonly known as: LaMICtal   200 mg, Oral, Daily      multivitamin tablet tablet  Commonly known as: THERAGRAN   1 tablet, Oral, Daily      naproxen 500 MG EC tablet  Commonly known as: EC NAPROSYN   500 mg, Oral, 2 Times Daily PRN      omeprazole 20 MG capsule  Commonly known as: priLOSEC   20 mg, Oral      Rexulti 2 MG tablet  Generic drug: Brexpiprazole   2 mg, Oral, Daily               No Known Allergies    Discharge Disposition:  Home or Self Care      Discharge Diet:  Diet Order   Procedures    Diet: Regular/House; Fluid Consistency: Thin (IDDSI 0)       Discharge Activity:       CODE STATUS:    Code Status and Medical Interventions: CPR (Attempt to Resuscitate); Full Support   Ordered at: 12/01/24 4281     Code Status (Patient has no pulse and is not breathing):    CPR (Attempt to Resuscitate)     Medical Interventions (Patient has pulse or is breathing):    Full Support       No future appointments.   Follow-up Information       Michelle Calles MD Follow up in 2 week(s).    Specialty: General Surgery  Why: Call to schedule appointment.  Contact information:  [address] [facility] [address]  [place] [id number] [phone]               Glory Laws MD .    Specialty: Family Medicine  Contact information:  [address] [facility] [id number] [phone]                             Time Spent on  Discharge:  Greater than 30 minutes      Arnulfo Palmer MD  [facility]  12/05/24  09:49 EST

## 2024-12-05 NOTE — PLAN OF CARE
Goal Outcome Evaluation:  Plan of Care Reviewed With: patient        Progress: improving  Outcome Evaluation: Patient is A&Ox4, VSS, on RA. Lap sites x4 with glue TYESHA, well approx. IS up to 1250. Zosyn given. Percocet given for pain management. SCDs on after being in chair during evening. Fall precautions maintained. Standby assist to restroom- urinary incontinence. POC ongoing.

## 2024-12-06 ENCOUNTER — READMISSION MANAGEMENT (OUTPATIENT)
Dept: CALL CENTER | Facility: HOSPITAL | Age: 64
End: 2024-12-06
Payer: MEDICARE

## 2024-12-06 NOTE — OUTREACH NOTE
Prep Survey      Flowsheet Row Responses   [facility] facility patient discharged from? [place]   Is LACE score < 7 ? No   Eligibility Readm Mgmt   Discharge diagnosis s/p Laparoscopic cholecystectomy with cholangiogram   Does the patient have one of the following disease processes/diagnoses(primary or secondary)? General Surgery   Does the patient have Home health ordered? No   Is there a DME ordered? No   Prep survey completed? Yes            Salud MUSTAFA - Registered Nurse

## 2024-12-10 ENCOUNTER — READMISSION MANAGEMENT (OUTPATIENT)
Dept: CALL CENTER | Facility: HOSPITAL | Age: 64
End: 2024-12-10
Payer: MEDICARE

## 2024-12-10 NOTE — OUTREACH NOTE
General Surgery Week 1 Survey      Flowsheet Row Responses   [facility] patient discharged from? [place]   Does the patient have one of the following disease processes/diagnoses(primary or secondary)? General Surgery   Week 1 attempt successful? Yes   Call start time 1644   Call end time 1647   Discharge diagnosis s/p Laparoscopic cholecystectomy with cholangiogram   Person spoke with today (if not patient) and relationship pt   Meds reviewed with patient/caregiver? Yes   Is the patient having any side effects they believe may be caused by any medication additions or changes? No   Does the patient have all medications related to this admission filled (includes all antibiotics, pain medications, etc.) Yes   Is the patient taking all medications as directed (includes completed medication regime)? Yes   Does the patient have a follow up appointment scheduled with their surgeon? Yes  [12/31/24]   Has the patient kept scheduled appointments due by today? N/A   Psychosocial issues? No   Did the patient receive a copy of their discharge instructions? Yes   Nursing interventions Reviewed instructions with patient   What is the patient's perception of their health status since discharge? Improving   Nursing interventions Nurse provided patient education   Is the patient /caregiver able to teach back basic post-op care? Take showers only when approved by MD-sponge bathe until then, No tub bath, swimming, or hot tub until instructed by MD, Keep incision areas clean,dry and protected, Lifting as instructed by MD in discharge instructions   Is the patient/caregiver able to teach back signs and symptoms of incisional infection? Increased redness, swelling or pain at the incisonal site, Increased drainage or bleeding, Incisional warmth, Pus or odor from incision, Fever   Is the patient/caregiver able to teach back steps to recovery at home? Rest and rebuild strength, gradually increase activity, Eat a well-balance diet    If the patient is a current smoker, are they able to teach back resources for cessation? Not a smoker   Is the patient/caregiver able to teach back the hierarchy of who to call/visit for symptoms/problems? PCP, Specialist, Home health nurse, Urgent Care, ED, 911 Yes   Week 1 call completed? Yes   Is the patient interested in additional calls from an ambulatory ? No   Would this patient benefit from a Referral to [facility]? No   Wrap up additional comments Pt states she is doing good, and denies any issues with surgical site. Reviewed AVS/meds/instructions with pt. Pt verified post-op appt   Call end time [id number]            Kelly MUSTAFA - Registered Nurse

## 2024-12-31 ENCOUNTER — OFFICE VISIT (OUTPATIENT)
Dept: SURGERY | Facility: CLINIC | Age: 64
End: 2024-12-31
Payer: MEDICARE

## 2024-12-31 VITALS
DIASTOLIC BLOOD PRESSURE: 86 MMHG | OXYGEN SATURATION: 93 % | SYSTOLIC BLOOD PRESSURE: 120 MMHG | WEIGHT: 183 LBS | BODY MASS INDEX: 35.93 KG/M2 | HEART RATE: 96 BPM | HEIGHT: 60 IN

## 2024-12-31 DIAGNOSIS — Z09 SURGICAL FOLLOWUP: Primary | ICD-10-CM

## 2024-12-31 PROCEDURE — 99024 POSTOP FOLLOW-UP VISIT: CPT | Performed by: SURGERY

## 2024-12-31 RX ORDER — KETOCONAZOLE 20 MG/ML
SHAMPOO, SUSPENSION TOPICAL
COMMUNITY
Start: 2024-12-15

## 2024-12-31 RX ORDER — FLUTICASONE PROPIONATE 50 MCG
SPRAY, SUSPENSION (ML) NASAL
COMMUNITY
Start: 2023-01-20

## 2024-12-31 NOTE — PROGRESS NOTES
CHIEF COMPLAINT:   Chief Complaint   Patient presents with    Post-op     Laparoscopic cholecystectomy with cholangiogram 12/4/24       HISTORY OF PRESENT ILLNESS:  This is a 64 y.o. female who presents for a post-operative visit after undergoing laparoscopic cholecystectomy with cholangiogram on 12/4/2024 following ERCP with numerous common bile duct stones extracted.  She has been doing very well since surgery with no significant postoperative pain and no fever, chills, jaundice, or scleral icterus.  She has had only a few episodes of diarrhea that has not been bothersome to her.    Pathology:   Gallbladder, cholecystectomy:               -Acute necrotizing cholecystitis and cholelithiasis.    PHYSICAL EXAM:  Lungs: Clear  Heart: RRR  ABD: Incisions are healing well without any erythema or signs of infection.  Ext: no significant edema, calves nontender    A/P:  This is a 64 y.o. female patient who is S/P laparoscopic cholecystectomy with cholangiogram on 12/4/2024    She is healing beautifully.  I discussed the benign pathology findings demonstrating acute necrotizing cholecystitis, which is consistent with the intraoperative findings.  She can return to all activities as tolerated, resume regular diet as tolerated, and see me back on an as-needed basis.    Michelle Calles MD  General, Robotic, and Endoscopic Surgery  [facility]    [address], [address]  [place] [address]  P: [phone]  F: [phone]

## (undated) DEVICE — STPCK 3WY D201 DISCOFIX

## (undated) DEVICE — ADHS SKIN SURG TISS VISC PREMIERPRO EXOFIN HI/VISC FAST/DRY

## (undated) DEVICE — DEV LK WIREGUIDE FUSN OLYMP SCP

## (undated) DEVICE — SUT VIC 0 UR6 27IN VCP603H

## (undated) DEVICE — TUBING, SUCTION, 1/4" X 10', STRAIGHT: Brand: MEDLINE

## (undated) DEVICE — ENDOPATH XCEL UNIVERSAL TROCAR STABLILITY SLEEVES: Brand: ENDOPATH XCEL

## (undated) DEVICE — ENDOPOUCH RETRIEVER SPECIMEN RETRIEVAL BAGS: Brand: ENDOPOUCH RETRIEVER

## (undated) DEVICE — COVER,C-ARM,41X74: Brand: MEDLINE

## (undated) DEVICE — BLCK/BITE BLOX W/DENTL/RIM W/STRAP 54F

## (undated) DEVICE — LN SMPL CO2 SHTRM SD STREAM W/M LUER

## (undated) DEVICE — SOL NACL 0.9PCT 1000ML

## (undated) DEVICE — SYR LL TP 10ML STRL

## (undated) DEVICE — SYR LUERLOK 20CC BX/50

## (undated) DEVICE — GOWN,SIRUS,NON REINFRCD,LARGE,SET IN SL: Brand: MEDLINE

## (undated) DEVICE — LAPAROVUE VISIBILITY SYSTEM LAPAROSCOPIC SOLUTIONS: Brand: LAPAROVUE

## (undated) DEVICE — RETRIEVAL BALLOON CATHETER: Brand: EXTRACTOR™ PRO RX

## (undated) DEVICE — Device

## (undated) DEVICE — SPHINCTEROTOME: Brand: HYDRATOME RX 44

## (undated) DEVICE — ERBE NESSY®PLATE 170 SPLIT; 168CM²; CABLE 3M: Brand: ERBE

## (undated) DEVICE — ADAPT CLN BIOGUARD AIR/H2O DISP

## (undated) DEVICE — SINGLE USE DISTAL COVER MAJ-2315: Brand: SINGLE USE DISTAL COVER

## (undated) DEVICE — KT ORCA ORCAPOD DISP STRL

## (undated) DEVICE — GLV SURG BIOGEL LTX PF 6

## (undated) DEVICE — CANN O2 ETCO2 FITS ALL CONN CO2 SMPL A/ 7IN DISP LF

## (undated) DEVICE — ENDOPATH XCEL BLUNT TIP TROCARS WITH SMOOTH SLEEVES: Brand: ENDOPATH XCEL

## (undated) DEVICE — DISPOSABLE MONOPOLAR ENDOSCOPIC CORD 10 FT. (3M): Brand: KIRWAN

## (undated) DEVICE — SENSR O2 OXIMAX FNGR A/ 18IN NONSTR

## (undated) DEVICE — GLV SURG SENSICARE POLYISPRN W/ALOE PF LF 6.5 GRN STRL

## (undated) DEVICE — PK LAP CHOLE BG

## (undated) DEVICE — ANTIBACTERIAL UNDYED BRAIDED (POLYGLACTIN 910), SYNTHETIC ABSORBABLE SUTURE: Brand: COATED VICRYL